# Patient Record
Sex: FEMALE | Race: WHITE | NOT HISPANIC OR LATINO | Employment: OTHER | URBAN - METROPOLITAN AREA
[De-identification: names, ages, dates, MRNs, and addresses within clinical notes are randomized per-mention and may not be internally consistent; named-entity substitution may affect disease eponyms.]

---

## 2017-05-15 ENCOUNTER — OFFICE VISIT (OUTPATIENT)
Dept: GASTROENTEROLOGY | Facility: CLINIC | Age: 67
End: 2017-05-15

## 2017-05-15 VITALS
DIASTOLIC BLOOD PRESSURE: 80 MMHG | HEIGHT: 62 IN | SYSTOLIC BLOOD PRESSURE: 130 MMHG | WEIGHT: 180.4 LBS | BODY MASS INDEX: 33.2 KG/M2

## 2017-05-15 DIAGNOSIS — K21.9 GASTROESOPHAGEAL REFLUX DISEASE WITHOUT ESOPHAGITIS: ICD-10-CM

## 2017-05-15 DIAGNOSIS — K52.9 INFLAMMATORY BOWEL DISEASES (IBD): ICD-10-CM

## 2017-05-15 DIAGNOSIS — K22.70 BARRETT'S ESOPHAGUS WITHOUT DYSPLASIA: ICD-10-CM

## 2017-05-15 DIAGNOSIS — R12 HEARTBURN: Primary | ICD-10-CM

## 2017-05-15 DIAGNOSIS — R10.9 CHRONIC ABDOMINAL PAIN: ICD-10-CM

## 2017-05-15 DIAGNOSIS — R14.0 ABDOMINAL BLOATING: ICD-10-CM

## 2017-05-15 DIAGNOSIS — K58.9 IRRITABLE BOWEL SYNDROME WITHOUT DIARRHEA: ICD-10-CM

## 2017-05-15 DIAGNOSIS — K50.10 CROHN'S DISEASE OF LARGE INTESTINE WITHOUT COMPLICATION (HCC): ICD-10-CM

## 2017-05-15 DIAGNOSIS — G89.29 CHRONIC ABDOMINAL PAIN: ICD-10-CM

## 2017-05-15 PROCEDURE — 99213 OFFICE O/P EST LOW 20 MIN: CPT | Performed by: INTERNAL MEDICINE

## 2017-06-02 RX ORDER — AMITRIPTYLINE HYDROCHLORIDE 25 MG/1
TABLET, FILM COATED ORAL
Qty: 30 TABLET | Refills: 5 | Status: SHIPPED | OUTPATIENT
Start: 2017-06-02 | End: 2017-11-10 | Stop reason: SDUPTHER

## 2017-06-02 RX ORDER — POTASSIUM CHLORIDE 750 MG/1
TABLET, FILM COATED, EXTENDED RELEASE ORAL
Qty: 120 TABLET | Refills: 5 | Status: SHIPPED | OUTPATIENT
Start: 2017-06-02 | End: 2018-01-04 | Stop reason: SDUPTHER

## 2017-06-06 ENCOUNTER — TELEPHONE (OUTPATIENT)
Dept: GASTROENTEROLOGY | Facility: CLINIC | Age: 67
End: 2017-06-06

## 2017-06-06 NOTE — TELEPHONE ENCOUNTER
Called [facility] and spoke with pharmacist. Pharmacist states pt was previously on a potassium chloride 10 mEq capsule that was much smaller than the potassium tablets we called in for her recently. Advised that as long as pt receiving potassium chloride 10 mEq four times daily, OK to change to capsule for comfort of pt.

## 2017-06-06 NOTE — TELEPHONE ENCOUNTER
----- Message from Cj Hobbs sent at 6/6/2017 10:31 AM EDT -----  Regarding: REFILL MEDS  Contact: [phone]   PT IS CALLING ABOUT MEDICATION potassium chloride? PT REQUESTING A REFILL BUT WANT WHAT SHE USE TO TAKE, SAYS THESES PILLS ARE TO BIG TO SWALLOW.   potassium chloride ER FRANCISCO 4 TIMES A DAY    PHARM#[phone]

## 2017-06-06 NOTE — TELEPHONE ENCOUNTER
----- Message from Radha Buchanan sent at 6/6/2017 11:34 AM EDT -----  Regarding: EARNESTINE  Contact: [phone]  BRITT FROM [facility] CALLED ABOUT

## 2017-06-14 ENCOUNTER — TRANSCRIBE ORDERS (OUTPATIENT)
Dept: ADMINISTRATIVE | Facility: HOSPITAL | Age: 67
End: 2017-06-14

## 2017-06-14 DIAGNOSIS — Z12.31 VISIT FOR SCREENING MAMMOGRAM: Primary | ICD-10-CM

## 2017-08-11 ENCOUNTER — TRANSCRIBE ORDERS (OUTPATIENT)
Dept: ADMINISTRATIVE | Facility: HOSPITAL | Age: 67
End: 2017-08-11

## 2017-08-11 DIAGNOSIS — M81.0 OSTEOPOROSIS: Primary | ICD-10-CM

## 2017-09-11 ENCOUNTER — HOSPITAL ENCOUNTER (OUTPATIENT)
Dept: BONE DENSITY | Facility: HOSPITAL | Age: 67
Discharge: HOME | End: 2017-09-11

## 2017-09-11 ENCOUNTER — HOSPITAL ENCOUNTER (OUTPATIENT)
Dept: MAMMOGRAPHY | Facility: HOSPITAL | Age: 67
Discharge: HOME | End: 2017-09-11
Admitting: FAMILY MEDICINE

## 2017-09-11 DIAGNOSIS — M81.0 OSTEOPOROSIS: ICD-10-CM

## 2017-09-11 DIAGNOSIS — Z12.31 VISIT FOR SCREENING MAMMOGRAM: ICD-10-CM

## 2017-09-11 PROCEDURE — 77080 DXA BONE DENSITY AXIAL: CPT

## 2017-09-11 PROCEDURE — G0202 SCR MAMMO BI INCL CAD: HCPCS

## 2017-09-11 PROCEDURE — 77063 BREAST TOMOSYNTHESIS BI: CPT

## 2017-09-18 ENCOUNTER — ANESTHESIA EVENT (OUTPATIENT)
Dept: GASTROENTEROLOGY | Facility: HOSPITAL | Age: 67
End: 2017-09-18

## 2017-09-18 ENCOUNTER — ANESTHESIA (OUTPATIENT)
Dept: GASTROENTEROLOGY | Facility: HOSPITAL | Age: 67
End: 2017-09-18

## 2017-09-18 ENCOUNTER — HOSPITAL ENCOUNTER (OUTPATIENT)
Facility: HOSPITAL | Age: 67
Setting detail: HOSPITAL OUTPATIENT SURGERY
Discharge: HOME | End: 2017-09-18
Attending: INTERNAL MEDICINE | Admitting: INTERNAL MEDICINE

## 2017-09-18 VITALS
TEMPERATURE: 97.7 F | OXYGEN SATURATION: 97 % | HEART RATE: 64 BPM | RESPIRATION RATE: 18 BRPM | SYSTOLIC BLOOD PRESSURE: 114 MMHG | HEIGHT: 62 IN | BODY MASS INDEX: 32.94 KG/M2 | WEIGHT: 179 LBS | DIASTOLIC BLOOD PRESSURE: 58 MMHG

## 2017-09-18 DIAGNOSIS — K50.10 CROHN'S DISEASE OF LARGE INTESTINE WITHOUT COMPLICATION (HCC): ICD-10-CM

## 2017-09-18 DIAGNOSIS — K21.9 GASTROESOPHAGEAL REFLUX DISEASE WITHOUT ESOPHAGITIS: ICD-10-CM

## 2017-09-18 DIAGNOSIS — R14.0 ABDOMINAL BLOATING: ICD-10-CM

## 2017-09-18 DIAGNOSIS — K58.9 IRRITABLE BOWEL SYNDROME WITHOUT DIARRHEA: ICD-10-CM

## 2017-09-18 DIAGNOSIS — K52.9 INFLAMMATORY BOWEL DISEASES (IBD): ICD-10-CM

## 2017-09-18 DIAGNOSIS — G89.29 CHRONIC ABDOMINAL PAIN: ICD-10-CM

## 2017-09-18 DIAGNOSIS — R12 HEARTBURN: ICD-10-CM

## 2017-09-18 DIAGNOSIS — K22.70 BARRETT'S ESOPHAGUS WITHOUT DYSPLASIA: ICD-10-CM

## 2017-09-18 DIAGNOSIS — R10.9 CHRONIC ABDOMINAL PAIN: ICD-10-CM

## 2017-09-18 PROCEDURE — 43239 EGD BIOPSY SINGLE/MULTIPLE: CPT | Performed by: INTERNAL MEDICINE

## 2017-09-18 PROCEDURE — 45380 COLONOSCOPY AND BIOPSY: CPT | Performed by: INTERNAL MEDICINE

## 2017-09-18 PROCEDURE — 25010000002 PROPOFOL 10 MG/ML EMULSION: Performed by: NURSE ANESTHETIST, CERTIFIED REGISTERED

## 2017-09-18 PROCEDURE — S0260 H&P FOR SURGERY: HCPCS | Performed by: INTERNAL MEDICINE

## 2017-09-18 PROCEDURE — 88305 TISSUE EXAM BY PATHOLOGIST: CPT | Performed by: INTERNAL MEDICINE

## 2017-09-18 RX ORDER — PROPOFOL 10 MG/ML
VIAL (ML) INTRAVENOUS CONTINUOUS PRN
Status: DISCONTINUED | OUTPATIENT
Start: 2017-09-18 | End: 2017-09-18 | Stop reason: SURG

## 2017-09-18 RX ORDER — PROPOFOL 10 MG/ML
VIAL (ML) INTRAVENOUS AS NEEDED
Status: DISCONTINUED | OUTPATIENT
Start: 2017-09-18 | End: 2017-09-18 | Stop reason: SURG

## 2017-09-18 RX ORDER — SODIUM CHLORIDE, SODIUM LACTATE, POTASSIUM CHLORIDE, CALCIUM CHLORIDE 600; 310; 30; 20 MG/100ML; MG/100ML; MG/100ML; MG/100ML
30 INJECTION, SOLUTION INTRAVENOUS CONTINUOUS PRN
Status: DISCONTINUED | OUTPATIENT
Start: 2017-09-18 | End: 2017-09-18 | Stop reason: HOSPADM

## 2017-09-18 RX ORDER — LIDOCAINE HYDROCHLORIDE 20 MG/ML
INJECTION, SOLUTION INFILTRATION; PERINEURAL AS NEEDED
Status: DISCONTINUED | OUTPATIENT
Start: 2017-09-18 | End: 2017-09-18 | Stop reason: SURG

## 2017-09-18 RX ADMIN — PROPOFOL 200 MCG/KG/MIN: 10 INJECTION, EMULSION INTRAVENOUS at 09:32

## 2017-09-18 RX ADMIN — SODIUM CHLORIDE, POTASSIUM CHLORIDE, SODIUM LACTATE AND CALCIUM CHLORIDE: 600; 310; 30; 20 INJECTION, SOLUTION INTRAVENOUS at 09:30

## 2017-09-18 RX ADMIN — SODIUM CHLORIDE, POTASSIUM CHLORIDE, SODIUM LACTATE AND CALCIUM CHLORIDE 30 ML/HR: 600; 310; 30; 20 INJECTION, SOLUTION INTRAVENOUS at 08:50

## 2017-09-18 RX ADMIN — PROPOFOL 100 MG: 10 INJECTION, EMULSION INTRAVENOUS at 09:32

## 2017-09-18 RX ADMIN — LIDOCAINE HYDROCHLORIDE 60 MG: 20 INJECTION, SOLUTION INFILTRATION; PERINEURAL at 09:32

## 2017-09-18 NOTE — ANESTHESIA PREPROCEDURE EVALUATION
Anesthesia Evaluation     no history of anesthetic complications:         Airway   Mallampati: II  TM distance: >3 FB  Neck ROM: full  small opening  Dental    (+) implants    Comment: Across top    Pulmonary    (-) shortness of breathSleep apnea: snores.  Cardiovascular     (+) hypertension, cardiac stents   (-) dysrhythmias, angina      Neuro/Psych  GI/Hepatic/Renal/Endo    (+)  GERD (Barretts, Crohns),   (-) liver disease (elevated LFTs)    Musculoskeletal     Abdominal    Substance History      OB/GYN          Other                                      Anesthesia Plan    ASA 2     MAC     intravenous induction   Anesthetic plan and risks discussed with patient.

## 2017-09-18 NOTE — H&P
Progress Notes  Encounter Date: 5/15/2017  Lamont Milian MD   Gastroenterology   Expand All Collapse All    []Hide copied text      Chief Complaint   Patient presents with   • Follow-up         Kirstin Hughes is a  66 y.o. female here for a follow up visit for abd pain, crohns disease, IBS     HPI Comments: Kirstin Hughes is a 65 y.o. female who presents in follow-up for Riddle's esophagus, Colonic Crohn's disease, irritable bowel syndrome, heartburn, nausea, colon polyps. Colonoscopy and EGD in August 2015 showed colon polyps and Riddle's esophagus without dysplasia. Her Crohn's disease is been in remission for greater than 5 years. We do colonoscopy every 2 years for surveillance.     Her irritable bowel syndrome with diarrhea is in remission.  She takes Levsin and Elavil as needed.  No nausea, vomiting or abdominal pain.     Regarding her Crohn's disease, she is still in remission.  No rectal bleeding.     No extraintestinal manifestations of inflammatory bowel disease            Medical History         Past Medical History:   Diagnosis Date   • Abnormal LFTs     • Riddle esophagus     • Crohn's disease     • Diverticulitis     • Diverticulosis     • GERD (gastroesophageal reflux disease)     • Hypertension     • Internal hemorrhoids     • Osteoarthritis     • Sleep apnea     • Thyroid disease               Surgical History          Past Surgical History:   Procedure Laterality Date   • BILATERAL BREAST REDUCTION       • CATARACT EXTRACTION       • COLON RESECTION       • COLONOSCOPY   8/20015     graeme DAMIAN   • HYSTERECTOMY       • REDUCTION MAMMAPLASTY       • TONSILLECTOMY       • UPPER GASTROINTESTINAL ENDOSCOPY   08/2015     z line irrefgular            Scheduled Meds:     Continuous Infusions:  No current facility-administered medications for this visit.      PRN Meds:.          Allergies   Allergen Reactions   • Flagyl [Metronidazole]     • Levaquin [Levofloxacin]            Social History     Social History            Social History   • Marital status:        Spouse name: N/A   • Number of children: N/A   • Years of education: N/A          Occupational History   • Not on file.            Social History Main Topics   • Smoking status: Former Smoker       Types: Cigarettes       Quit date: 1993   • Smokeless tobacco: Never Used   • Alcohol use No   • Drug use: No   • Sexual activity: Not on file           Other Topics Concern   • Not on file      Social History Narrative                  Family History   Problem Relation Age of Onset   • Colon polyps Father     • Pancreatic cancer Paternal Grandmother     • Colon cancer Maternal Grandfather     • Breast cancer Sister           Review of Systems   Gastrointestinal: Positive for abdominal distention, abdominal pain and diarrhea. Negative for anal bleeding, blood in stool, constipation, nausea, rectal pain and vomiting.   All other systems reviewed and are negative.        Vitals:     05/15/17 1250   BP: 130/80         Physical Exam   Constitutional: She is oriented to person, place, and time. She appears well-developed and well-nourished.   HENT:   Head: Normocephalic and atraumatic.   Eyes: EOM are normal. Pupils are equal, round, and reactive to light.   Cardiovascular: Normal rate, regular rhythm and normal heart sounds.    Pulmonary/Chest: Effort normal.   Abdominal: Soft. Bowel sounds are normal. She exhibits no distension and no mass. There is no tenderness. No hernia.   Musculoskeletal: Normal range of motion.   Neurological: She is alert and oriented to person, place, and time.   Skin: Skin is dry.   Psychiatric: She has a normal mood and affect. Her behavior is normal. Judgment and thought content normal.         No images are attached to the encounter.     Problem list     Crohn's disease:  Abdominal pain  Irritable bowel syndrome  Diarrhea  Riddle's esophagus        Assessment/Plan     EGD and colonoscopy will be scheduled for 2 year  surveillance     An EGD and  colonoscopy will be scheduled by my staff, the instructions will either be handed to you or mailed to you.  You'll receive an appointment date and time.  You will need to bring a  with you on that day to drive you home.     Continue Levsin and Elavil     Please begin a probiotic.  You can try activia yogurt, align, or florastor.  These can be found over-the-counter at a local grocery store.           Office Visit on 5/15/2017              Detailed Report

## 2017-09-18 NOTE — PLAN OF CARE
Problem: Patient Care Overview (Adult)  Goal: Plan of Care Review  Outcome: Ongoing (interventions implemented as appropriate)    09/18/17 0828   Coping/Psychosocial Response Interventions   Plan Of Care Reviewed With patient   Patient Care Overview   Progress no change       Goal: Adult Individualization and Mutuality  Outcome: Ongoing (interventions implemented as appropriate)  Goal: Discharge Needs Assessment  Outcome: Ongoing (interventions implemented as appropriate)    09/18/17 0828   Discharge Needs Assessment   Concerns To Be Addressed no discharge needs identified         Problem: GI Endoscopy (Adult)  Goal: Signs and Symptoms of Listed Potential Problems Will be Absent or Manageable (GI Endoscopy)  Outcome: Ongoing (interventions implemented as appropriate)    09/18/17 0828   GI Endoscopy   Problems Assessed (GI Endoscopy) all   Problems Present (GI Endoscopy) none

## 2017-09-18 NOTE — DISCHARGE INSTRUCTIONS
For the next 24 hours patient needs to be with a responsible adult.    For 24 hours DO NOT drive, operate machinery, appliances, drink alcohol, make important decisions or sign legal documents.    Start with a light or bland diet and advance to regular diet as tolerated.    Follow recommendations on procedure report provided by your doctor.    Call Dr Milian for problems [phone]. Call for pathology results in 2 weeks.    Problems may include but not limited to: large amounts of bleeding, trouble breathing, repeated vomiting, severe unrelieved pain, fever or chills.

## 2017-09-18 NOTE — ANESTHESIA POSTPROCEDURE EVALUATION
"Patient: Kirstin Hughes    Procedure Summary     Date Anesthesia Start Anesthesia Stop Room / Location    09/18/17 0929 0958  [facility] /  [facility]       Procedure Diagnosis Surgeon Provider    ESOPHAGOGASTRODUODENOSCOPY WITH BIOPSY (N/A Esophagus); COLONOSCOPY TO CECUM AND TERM ILEUM  WITH BIOPSIES (N/A ) Heartburn; Abdominal bloating; Chronic abdominal pain; Gastroesophageal reflux disease without esophagitis; Inflammatory bowel diseases (IBD); Riddle's esophagus without dysplasia; Crohn's disease of large intestine without complication; Irritable bowel syndrome without diarrhea  (Heartburn [R12]; Abdominal bloating [R14.0]; Chronic abdominal pain [R10.9, G89.29]; Gastroesophageal reflux disease without esophagitis [K21.9]; Inflammatory bowel diseases (IBD) [K63.89]; Riddle's esophagus without dysplasia [K22.70]; Crohn's disease of large intestine without complication [K50.10]; Irritable bowel syndrome without diarrhea [K58.9]) MD Jana Boucher MD          Anesthesia Type: MAC  Last vitals  BP   114/58 (09/18/17 1019)    Temp   36.5 °C (97.7 °F) (09/18/17 1009)    Pulse   64 (09/18/17 1019)   Resp   18 (09/18/17 1019)    SpO2   97 % (09/18/17 1019)      Post Anesthesia Care and Evaluation    Patient location during evaluation: PACU  Patient participation: complete - patient participated  Level of consciousness: awake  Pain score: 0  Pain management: adequate  Airway patency: patent  Anesthetic complications: No anesthetic complications    Cardiovascular status: acceptable  Respiratory status: acceptable  Hydration status: acceptable    Comments: Blood pressure 114/58, pulse 64, temperature 36.5 °C (97.7 °F), temperature source Oral, resp. rate 18, height 61.5\" (156.2 cm), weight 179 lb (81.2 kg), SpO2 97 %.        "

## 2017-09-19 LAB
CYTO UR: NORMAL
LAB AP CASE REPORT: NORMAL
Lab: NORMAL
PATH REPORT.FINAL DX SPEC: NORMAL
PATH REPORT.GROSS SPEC: NORMAL

## 2017-09-26 ENCOUNTER — TELEPHONE (OUTPATIENT)
Dept: GASTROENTEROLOGY | Facility: CLINIC | Age: 67
End: 2017-09-26

## 2017-09-26 NOTE — TELEPHONE ENCOUNTER
----- Message from Lamont Milian MD sent at 9/19/2017 12:47 PM EDT -----  Pathology benign.  EGD and colonoscopy 2 years recall  Office Visit 6 months

## 2017-09-26 NOTE — TELEPHONE ENCOUNTER
Patient called, advised of Dr. Milian's note. She verb understanding. Patient's health maintenance record was updated to reflect the need to repeat her colonoscopy and EGD in 2 years.

## 2017-11-10 RX ORDER — AMITRIPTYLINE HYDROCHLORIDE 25 MG/1
TABLET, FILM COATED ORAL
Qty: 30 TABLET | Refills: 8 | Status: SHIPPED | OUTPATIENT
Start: 2017-11-10 | End: 2018-06-25 | Stop reason: SDUPTHER

## 2018-01-06 RX ORDER — POTASSIUM CHLORIDE 750 MG/1
TABLET, FILM COATED, EXTENDED RELEASE ORAL
Qty: 120 TABLET | Refills: 4 | Status: SHIPPED | OUTPATIENT
Start: 2018-01-06 | End: 2018-05-29 | Stop reason: SDUPTHER

## 2018-05-30 RX ORDER — POTASSIUM CHLORIDE 750 MG/1
TABLET, FILM COATED, EXTENDED RELEASE ORAL
Qty: 120 TABLET | Refills: 3 | Status: SHIPPED | OUTPATIENT
Start: 2018-05-30 | End: 2018-09-28 | Stop reason: SDUPTHER

## 2018-06-25 ENCOUNTER — OFFICE VISIT (OUTPATIENT)
Dept: GASTROENTEROLOGY | Facility: CLINIC | Age: 68
End: 2018-06-25

## 2018-06-25 VITALS
WEIGHT: 181.2 LBS | BODY MASS INDEX: 33.34 KG/M2 | SYSTOLIC BLOOD PRESSURE: 150 MMHG | DIASTOLIC BLOOD PRESSURE: 92 MMHG | TEMPERATURE: 99.1 F | HEIGHT: 62 IN

## 2018-06-25 DIAGNOSIS — R12 HEARTBURN: ICD-10-CM

## 2018-06-25 DIAGNOSIS — K22.70 BARRETT'S ESOPHAGUS WITHOUT DYSPLASIA: Primary | ICD-10-CM

## 2018-06-25 DIAGNOSIS — R14.0 ABDOMINAL BLOATING: ICD-10-CM

## 2018-06-25 DIAGNOSIS — K52.9 INFLAMMATORY BOWEL DISEASES (IBD): ICD-10-CM

## 2018-06-25 DIAGNOSIS — K21.9 GASTROESOPHAGEAL REFLUX DISEASE WITHOUT ESOPHAGITIS: ICD-10-CM

## 2018-06-25 DIAGNOSIS — K50.10 CROHN'S DISEASE OF LARGE INTESTINE WITHOUT COMPLICATION (HCC): ICD-10-CM

## 2018-06-25 PROCEDURE — 99213 OFFICE O/P EST LOW 20 MIN: CPT | Performed by: INTERNAL MEDICINE

## 2018-06-25 RX ORDER — AMITRIPTYLINE HYDROCHLORIDE 25 MG/1
25 TABLET, FILM COATED ORAL
Qty: 30 TABLET | Refills: 8 | Status: SHIPPED | OUTPATIENT
Start: 2018-06-25 | End: 2019-03-28 | Stop reason: SDUPTHER

## 2018-06-25 NOTE — PROGRESS NOTES
No chief complaint on file.      Kirstin Hughes is a  68 y.o. female here for a follow up visit for abdominal pain, IBS-D, Riddle's esophagus no dysplasia, Crohn's disease in remission    HPI Comments: Kirstin Hughes is a 65 y.o. female who presents in follow-up for Riddle's esophagus, Colonic Crohn's disease, irritable bowel syndrome, heartburn, nausea, colon polyps. Colonoscopy and EGD in August 2017 showed  Riddle's esophagus without dysplasia. Her Crohn's disease is been in remission for greater than 5 years. We do colonoscopy every 2 years for surveillance.     Her irritable bowel syndrome with diarrhea is in remission.  She takes Levsin prn and Elavil 25mg po qhs.  No nausea, vomiting or abdominal pain.     Regarding her Crohn's disease, she is still in remission.  No rectal bleeding.     No extraintestinal manifestations of inflammatory bowel disease  GERD is in remission with over-the-counter Nexium          Past Medical History:   Diagnosis Date   • Abnormal LFTs    • Riddle esophagus    • Crohn's disease    • Diverticulitis    • Diverticulosis    • GERD (gastroesophageal reflux disease)    • Hypertension    • Internal hemorrhoids    • Osteoarthritis    • Sleep apnea    • Thyroid disease        Past Surgical History:   Procedure Laterality Date   • BILATERAL BREAST REDUCTION     • CATARACT EXTRACTION     • CERVICAL DISC SURGERY     • COLON RESECTION      DIVERTICULITIS 2007 - POKORNY   • COLONOSCOPY  8/20015    NBIH, tics   • COLONOSCOPY N/A 9/18/2017    Diverticulosis, IH, Crohn's in remission, Path benign   • ENDOSCOPY N/A 9/18/2017    Z-line irreg, Path benign   • HYSTERECTOMY     • REDUCTION MAMMAPLASTY     • SALIVARY GLAND EXCISION     • THYROIDECTOMY, PARTIAL     • TONSILLECTOMY     • UPPER GASTROINTESTINAL ENDOSCOPY  08/2015    z line irrefgular       Scheduled Meds:    Continuous Infusions:  No current facility-administered medications for this visit.     PRN Meds:.    Allergies   Allergen  Reactions   • Flagyl [Metronidazole]    • Levaquin [Levofloxacin]        Social History     Social History   • Marital status:      Spouse name: N/A   • Number of children: N/A   • Years of education: N/A     Occupational History   • Not on file.     Social History Main Topics   • Smoking status: Former Smoker     Types: Cigarettes     Quit date: 1993   • Smokeless tobacco: Never Used   • Alcohol use No   • Drug use: No   • Sexual activity: Not on file     Other Topics Concern   • Not on file     Social History Narrative   • No narrative on file       Family History   Problem Relation Age of Onset   • Colon polyps Father    • Pancreatic cancer Paternal Grandmother    • Colon cancer Maternal Grandfather    • Breast cancer Sister        Review of Systems   Gastrointestinal: Positive for abdominal distention, abdominal pain, diarrhea and nausea.   All other systems reviewed and are negative.      There were no vitals filed for this visit.    Physical Exam   Constitutional: She is oriented to person, place, and time. She appears well-developed and well-nourished.   HENT:   Head: Normocephalic and atraumatic.   Eyes: Pupils are equal, round, and reactive to light.   Cardiovascular: Normal rate, regular rhythm and normal heart sounds.    Pulmonary/Chest: Effort normal and breath sounds normal.   Abdominal: Soft. Bowel sounds are normal. She exhibits no shifting dullness, no distension, no pulsatile liver, no fluid wave, no abdominal bruit, no ascites, no pulsatile midline mass and no mass. There is no hepatosplenomegaly. There is no tenderness. There is no rigidity and no guarding. No hernia.   Musculoskeletal: Normal range of motion.   Neurological: She is alert and oriented to person, place, and time.   Skin: Skin is warm and dry.   Psychiatric: She has a normal mood and affect. Her behavior is normal. Thought content normal.   Nursing note and vitals reviewed.      No images are attached to the  encounter.    Problem list    GERD  Riddle's esophagus  IBS-D  Crohn's disease  Chronic abdominal pain      Assessment/Plan    Increase Elavil to 37.5 mg by mouth daily at bedtime  Levsin as needed  Nexium over-the-counter to be taken in the evening to prevent nighttime reflux  She may need an over-the-counter Nexium in the morning if she has daytime reflux  Colonoscopy and EGD for surveillance for Crohn's disease in 1 year  Return to clinic 6 months

## 2018-07-31 ENCOUNTER — TRANSCRIBE ORDERS (OUTPATIENT)
Dept: ADMINISTRATIVE | Facility: HOSPITAL | Age: 68
End: 2018-07-31

## 2018-07-31 DIAGNOSIS — Z12.31 VISIT FOR SCREENING MAMMOGRAM: Primary | ICD-10-CM

## 2018-09-12 ENCOUNTER — HOSPITAL ENCOUNTER (OUTPATIENT)
Dept: MAMMOGRAPHY | Facility: HOSPITAL | Age: 68
Discharge: HOME | End: 2018-09-12
Admitting: FAMILY MEDICINE

## 2018-09-12 DIAGNOSIS — Z12.31 VISIT FOR SCREENING MAMMOGRAM: ICD-10-CM

## 2018-09-12 PROCEDURE — 77067 SCR MAMMO BI INCL CAD: CPT

## 2018-09-12 PROCEDURE — 77063 BREAST TOMOSYNTHESIS BI: CPT

## 2018-10-04 RX ORDER — POTASSIUM CHLORIDE 750 MG/1
TABLET, FILM COATED, EXTENDED RELEASE ORAL
Qty: 120 TABLET | Refills: 2 | Status: SHIPPED | OUTPATIENT
Start: 2018-10-04 | End: 2018-12-28 | Stop reason: SDUPTHER

## 2018-12-18 ENCOUNTER — OFFICE VISIT (OUTPATIENT)
Dept: GASTROENTEROLOGY | Facility: CLINIC | Age: 68
End: 2018-12-18

## 2018-12-18 VITALS
WEIGHT: 181.6 LBS | HEIGHT: 61 IN | BODY MASS INDEX: 34.29 KG/M2 | DIASTOLIC BLOOD PRESSURE: 90 MMHG | TEMPERATURE: 99.1 F | SYSTOLIC BLOOD PRESSURE: 143 MMHG

## 2018-12-18 DIAGNOSIS — K22.70 BARRETT'S ESOPHAGUS WITHOUT DYSPLASIA: Primary | ICD-10-CM

## 2018-12-18 DIAGNOSIS — R12 HEARTBURN: ICD-10-CM

## 2018-12-18 DIAGNOSIS — R14.0 ABDOMINAL BLOATING: ICD-10-CM

## 2018-12-18 DIAGNOSIS — K50.10 CROHN'S DISEASE OF LARGE INTESTINE WITHOUT COMPLICATION (HCC): ICD-10-CM

## 2018-12-18 DIAGNOSIS — K58.9 IRRITABLE BOWEL SYNDROME WITHOUT DIARRHEA: ICD-10-CM

## 2018-12-18 PROCEDURE — 99213 OFFICE O/P EST LOW 20 MIN: CPT | Performed by: INTERNAL MEDICINE

## 2019-01-03 RX ORDER — POTASSIUM CHLORIDE 750 MG/1
TABLET, FILM COATED, EXTENDED RELEASE ORAL
Qty: 120 TABLET | Refills: 1 | Status: SHIPPED | OUTPATIENT
Start: 2019-01-03 | End: 2019-02-21 | Stop reason: SDUPTHER

## 2019-02-22 RX ORDER — POTASSIUM CHLORIDE 750 MG/1
TABLET, FILM COATED, EXTENDED RELEASE ORAL
Qty: 120 TABLET | Refills: 5 | Status: SHIPPED | OUTPATIENT
Start: 2019-02-22 | End: 2019-10-09 | Stop reason: SDUPTHER

## 2019-04-09 RX ORDER — AMITRIPTYLINE HYDROCHLORIDE 25 MG/1
50 TABLET, FILM COATED ORAL NIGHTLY PRN
Qty: 60 TABLET | Refills: 2 | Status: SHIPPED | OUTPATIENT
Start: 2019-04-09 | End: 2019-07-28 | Stop reason: SDUPTHER

## 2019-05-02 ENCOUNTER — TRANSCRIBE ORDERS (OUTPATIENT)
Dept: ADMINISTRATIVE | Facility: HOSPITAL | Age: 69
End: 2019-05-02

## 2019-05-02 DIAGNOSIS — I73.00 SECONDARY RAYNAUD'S PHENOMENON: Primary | ICD-10-CM

## 2019-05-29 ENCOUNTER — HOSPITAL ENCOUNTER (OUTPATIENT)
Dept: RESPIRATORY THERAPY | Facility: HOSPITAL | Age: 69
Discharge: HOME | End: 2019-05-29
Admitting: NURSE PRACTITIONER

## 2019-05-29 ENCOUNTER — HOSPITAL ENCOUNTER (OUTPATIENT)
Dept: GENERAL RADIOLOGY | Facility: HOSPITAL | Age: 69
Discharge: HOME | End: 2019-05-29

## 2019-05-29 ENCOUNTER — HOSPITAL ENCOUNTER (OUTPATIENT)
Dept: CARDIOLOGY | Facility: HOSPITAL | Age: 69
Discharge: HOME | End: 2019-05-29

## 2019-05-29 VITALS
BODY MASS INDEX: 34.36 KG/M2 | DIASTOLIC BLOOD PRESSURE: 63 MMHG | WEIGHT: 182 LBS | HEIGHT: 61 IN | SYSTOLIC BLOOD PRESSURE: 111 MMHG

## 2019-05-29 DIAGNOSIS — I73.00 RAYNAUD'S DISEASE WITHOUT GANGRENE: ICD-10-CM

## 2019-05-29 DIAGNOSIS — I73.00 SECONDARY RAYNAUD'S PHENOMENON: ICD-10-CM

## 2019-05-29 DIAGNOSIS — R76.8 FALSE POSITIVE SEROLOGICAL TEST FOR SYPHILIS: ICD-10-CM

## 2019-05-29 LAB
AORTIC DIMENSIONLESS INDEX: 0.8 (DI)
BDY SITE: NORMAL
BH CV ECHO MEAS - ACS: 1.5 CM
BH CV ECHO MEAS - AO MAX PG (FULL): 0.27 MMHG
BH CV ECHO MEAS - AO MAX PG: 5.2 MMHG
BH CV ECHO MEAS - AO MEAN PG (FULL): 1 MMHG
BH CV ECHO MEAS - AO MEAN PG: 3 MMHG
BH CV ECHO MEAS - AO ROOT AREA (BSA CORRECTED): 1.6
BH CV ECHO MEAS - AO ROOT AREA: 6.6 CM^2
BH CV ECHO MEAS - AO ROOT DIAM: 2.9 CM
BH CV ECHO MEAS - AO V2 MAX: 114 CM/SEC
BH CV ECHO MEAS - AO V2 MEAN: 73.9 CM/SEC
BH CV ECHO MEAS - AO V2 VTI: 28.5 CM
BH CV ECHO MEAS - AVA(I,A): 1.9 CM^2
BH CV ECHO MEAS - AVA(I,D): 1.9 CM^2
BH CV ECHO MEAS - AVA(V,A): 2.2 CM^2
BH CV ECHO MEAS - AVA(V,D): 2.2 CM^2
BH CV ECHO MEAS - BSA(HAYCOCK): 1.9 M^2
BH CV ECHO MEAS - BSA: 1.8 M^2
BH CV ECHO MEAS - BZI_BMI: 34.4 KILOGRAMS/M^2
BH CV ECHO MEAS - BZI_METRIC_HEIGHT: 154.9 CM
BH CV ECHO MEAS - BZI_METRIC_WEIGHT: 82.6 KG
BH CV ECHO MEAS - EDV(CUBED): 103.8 ML
BH CV ECHO MEAS - EDV(MOD-SP2): 72 ML
BH CV ECHO MEAS - EDV(MOD-SP4): 82 ML
BH CV ECHO MEAS - EDV(TEICH): 102.4 ML
BH CV ECHO MEAS - EF(CUBED): 71.3 %
BH CV ECHO MEAS - EF(MOD-BP): 67 %
BH CV ECHO MEAS - EF(MOD-SP2): 69.4 %
BH CV ECHO MEAS - EF(MOD-SP4): 64.6 %
BH CV ECHO MEAS - EF(TEICH): 63 %
BH CV ECHO MEAS - ESV(CUBED): 29.8 ML
BH CV ECHO MEAS - ESV(MOD-SP2): 22 ML
BH CV ECHO MEAS - ESV(MOD-SP4): 29 ML
BH CV ECHO MEAS - ESV(TEICH): 37.9 ML
BH CV ECHO MEAS - FS: 34 %
BH CV ECHO MEAS - IVS/LVPW: 1.1
BH CV ECHO MEAS - IVSD: 0.9 CM
BH CV ECHO MEAS - LAT PEAK E' VEL: 7 CM/SEC
BH CV ECHO MEAS - LV DIASTOLIC VOL/BSA (35-75): 45.2 ML/M^2
BH CV ECHO MEAS - LV MASS(C)D: 132.3 GRAMS
BH CV ECHO MEAS - LV MASS(C)DI: 72.9 GRAMS/M^2
BH CV ECHO MEAS - LV MAX PG: 4.9 MMHG
BH CV ECHO MEAS - LV MEAN PG: 2 MMHG
BH CV ECHO MEAS - LV SYSTOLIC VOL/BSA (12-30): 16 ML/M^2
BH CV ECHO MEAS - LV V1 MAX: 111 CM/SEC
BH CV ECHO MEAS - LV V1 MEAN: 73.5 CM/SEC
BH CV ECHO MEAS - LV V1 VTI: 23.5 CM
BH CV ECHO MEAS - LVIDD: 4.7 CM
BH CV ECHO MEAS - LVIDS: 3.1 CM
BH CV ECHO MEAS - LVLD AP2: 7.2 CM
BH CV ECHO MEAS - LVLD AP4: 7.5 CM
BH CV ECHO MEAS - LVLS AP2: 6 CM
BH CV ECHO MEAS - LVLS AP4: 6 CM
BH CV ECHO MEAS - LVOT AREA (M): 2.3 CM^2
BH CV ECHO MEAS - LVOT AREA: 2.3 CM^2
BH CV ECHO MEAS - LVOT DIAM: 1.7 CM
BH CV ECHO MEAS - LVPWD: 0.8 CM
BH CV ECHO MEAS - MED PEAK E' VEL: 8 CM/SEC
BH CV ECHO MEAS - MV A DUR: 0.17 SEC
BH CV ECHO MEAS - MV A MAX VEL: 75.2 CM/SEC
BH CV ECHO MEAS - MV DEC SLOPE: 368 CM/SEC^2
BH CV ECHO MEAS - MV DEC TIME: 320 SEC
BH CV ECHO MEAS - MV E MAX VEL: 52.2 CM/SEC
BH CV ECHO MEAS - MV E/A: 0.69
BH CV ECHO MEAS - MV MAX PG: 2.2 MMHG
BH CV ECHO MEAS - MV MEAN PG: 1 MMHG
BH CV ECHO MEAS - MV P1/2T MAX VEL: 70.6 CM/SEC
BH CV ECHO MEAS - MV P1/2T: 56.2 MSEC
BH CV ECHO MEAS - MV V2 MAX: 74.9 CM/SEC
BH CV ECHO MEAS - MV V2 MEAN: 35.7 CM/SEC
BH CV ECHO MEAS - MV V2 VTI: 22.7 CM
BH CV ECHO MEAS - MVA P1/2T LCG: 3.1 CM^2
BH CV ECHO MEAS - MVA(P1/2T): 3.9 CM^2
BH CV ECHO MEAS - MVA(VTI): 2.3 CM^2
BH CV ECHO MEAS - PA ACC TIME: 0.12 SEC
BH CV ECHO MEAS - PA MAX PG (FULL): 1.4 MMHG
BH CV ECHO MEAS - PA MAX PG: 2.6 MMHG
BH CV ECHO MEAS - PA PR(ACCEL): 23.7 MMHG
BH CV ECHO MEAS - PA V2 MAX: 80.9 CM/SEC
BH CV ECHO MEAS - PULM A REVS DUR: 0.15 SEC
BH CV ECHO MEAS - PULM A REVS VEL: 44.1 CM/SEC
BH CV ECHO MEAS - PULM DIAS VEL: 42.2 CM/SEC
BH CV ECHO MEAS - PULM S/D: 1.4
BH CV ECHO MEAS - PULM SYS VEL: 60.1 CM/SEC
BH CV ECHO MEAS - PVA(V,A): 2.4 CM^2
BH CV ECHO MEAS - PVA(V,D): 2.4 CM^2
BH CV ECHO MEAS - QP/QS: 0.72
BH CV ECHO MEAS - RV MAX PG: 1.2 MMHG
BH CV ECHO MEAS - RV MEAN PG: 1 MMHG
BH CV ECHO MEAS - RV V1 MAX: 55.4 CM/SEC
BH CV ECHO MEAS - RV V1 MEAN: 34.2 CM/SEC
BH CV ECHO MEAS - RV V1 VTI: 11.1 CM
BH CV ECHO MEAS - RVOT AREA: 3.5 CM^2
BH CV ECHO MEAS - RVOT DIAM: 2.1 CM
BH CV ECHO MEAS - SI(AO): 103.7 ML/M^2
BH CV ECHO MEAS - SI(CUBED): 40.8 ML/M^2
BH CV ECHO MEAS - SI(LVOT): 29.4 ML/M^2
BH CV ECHO MEAS - SI(MOD-SP2): 27.5 ML/M^2
BH CV ECHO MEAS - SI(MOD-SP4): 29.2 ML/M^2
BH CV ECHO MEAS - SI(TEICH): 35.5 ML/M^2
BH CV ECHO MEAS - SV(AO): 188.2 ML
BH CV ECHO MEAS - SV(CUBED): 74 ML
BH CV ECHO MEAS - SV(LVOT): 53.3 ML
BH CV ECHO MEAS - SV(MOD-SP2): 50 ML
BH CV ECHO MEAS - SV(MOD-SP4): 53 ML
BH CV ECHO MEAS - SV(RVOT): 38.4 ML
BH CV ECHO MEAS - SV(TEICH): 64.4 ML
BH CV ECHO MEAS - TAPSE (>1.6): 2.2 CM2
BH CV ECHO MEASUREMENTS AVERAGE E/E' RATIO: 6.96
BH CV VAS BP RIGHT ARM: NORMAL MMHG
BH CV XLRA - RV BASE: 3.2 CM
BH CV XLRA - TDI S': 14 CM/SEC
HGB BLDA-MCNC: 14.6 G/DL
LEFT ATRIUM VOLUME INDEX: 16 ML/M2
LEFT ATRIUM VOLUME: 29 CM3
MAXIMAL PREDICTED HEART RATE: 150 BPM
STRESS TARGET HR: 128 BPM

## 2019-05-29 PROCEDURE — 82820 HEMOGLOBIN-OXYGEN AFFINITY: CPT | Performed by: NURSE PRACTITIONER

## 2019-05-29 PROCEDURE — 93306 TTE W/DOPPLER COMPLETE: CPT | Performed by: INTERNAL MEDICINE

## 2019-05-29 PROCEDURE — 93306 TTE W/DOPPLER COMPLETE: CPT

## 2019-05-29 PROCEDURE — 71046 X-RAY EXAM CHEST 2 VIEWS: CPT

## 2019-05-29 PROCEDURE — 94010 BREATHING CAPACITY TEST: CPT

## 2019-05-29 PROCEDURE — 94726 PLETHYSMOGRAPHY LUNG VOLUMES: CPT

## 2019-05-29 PROCEDURE — 94729 DIFFUSING CAPACITY: CPT

## 2019-07-30 RX ORDER — AMITRIPTYLINE HYDROCHLORIDE 25 MG/1
50 TABLET, FILM COATED ORAL NIGHTLY PRN
Qty: 60 TABLET | Refills: 0 | Status: SHIPPED | OUTPATIENT
Start: 2019-07-30 | End: 2019-08-29 | Stop reason: SDUPTHER

## 2019-08-30 RX ORDER — AMITRIPTYLINE HYDROCHLORIDE 25 MG/1
50 TABLET, FILM COATED ORAL NIGHTLY
Qty: 60 TABLET | Refills: 1 | Status: SHIPPED | OUTPATIENT
Start: 2019-08-30 | End: 2019-11-30 | Stop reason: SDUPTHER

## 2019-09-04 ENCOUNTER — TRANSCRIBE ORDERS (OUTPATIENT)
Dept: ADMINISTRATIVE | Facility: HOSPITAL | Age: 69
End: 2019-09-04

## 2019-09-04 DIAGNOSIS — Z12.31 VISIT FOR SCREENING MAMMOGRAM: Primary | ICD-10-CM

## 2019-09-17 ENCOUNTER — HOSPITAL ENCOUNTER (OUTPATIENT)
Dept: MAMMOGRAPHY | Facility: HOSPITAL | Age: 69
Discharge: HOME | End: 2019-09-17
Admitting: FAMILY MEDICINE

## 2019-09-17 DIAGNOSIS — Z12.31 VISIT FOR SCREENING MAMMOGRAM: ICD-10-CM

## 2019-09-17 PROCEDURE — 77067 SCR MAMMO BI INCL CAD: CPT

## 2019-09-17 PROCEDURE — 77063 BREAST TOMOSYNTHESIS BI: CPT

## 2019-10-09 RX ORDER — POTASSIUM CHLORIDE 750 MG/1
TABLET, FILM COATED, EXTENDED RELEASE ORAL
Qty: 120 TABLET | Refills: 4 | Status: SHIPPED | OUTPATIENT
Start: 2019-10-09 | End: 2020-03-21 | Stop reason: SDUPTHER

## 2019-11-19 ENCOUNTER — OFFICE VISIT (OUTPATIENT)
Dept: GASTROENTEROLOGY | Facility: CLINIC | Age: 69
End: 2019-11-19

## 2019-11-19 VITALS
DIASTOLIC BLOOD PRESSURE: 88 MMHG | WEIGHT: 184 LBS | BODY MASS INDEX: 34.74 KG/M2 | TEMPERATURE: 98.8 F | SYSTOLIC BLOOD PRESSURE: 158 MMHG | HEIGHT: 61 IN

## 2019-11-19 DIAGNOSIS — R14.0 ABDOMINAL BLOATING: ICD-10-CM

## 2019-11-19 DIAGNOSIS — K22.70 BARRETT'S ESOPHAGUS WITHOUT DYSPLASIA: Primary | ICD-10-CM

## 2019-11-19 DIAGNOSIS — K58.9 IRRITABLE BOWEL SYNDROME WITHOUT DIARRHEA: ICD-10-CM

## 2019-11-19 DIAGNOSIS — K21.9 GASTROESOPHAGEAL REFLUX DISEASE WITHOUT ESOPHAGITIS: ICD-10-CM

## 2019-11-19 PROCEDURE — 99213 OFFICE O/P EST LOW 20 MIN: CPT | Performed by: INTERNAL MEDICINE

## 2019-11-19 RX ORDER — TELMISARTAN 40 MG/1
40 TABLET ORAL DAILY
COMMUNITY
End: 2020-07-28 | Stop reason: ALTCHOICE

## 2019-11-19 NOTE — PROGRESS NOTES
Chief Complaint   Patient presents with   • Follow-up   • Heartburn   • Crohn's Disease       Kirstin Hughes is a  69 y.o. female here for a follow up visit for abdominal pain, IBS-D, Riddle's esophagus no dysplasia, Crohn's disease in remission     HPI Comments: Kirstin Hughes is a 68 y.o. female who presents in follow-up for Riddle's esophagus, Colonic Crohn's disease, irritable bowel syndrome, heartburn, nausea, colon polyps. Colonoscopy and EGD in August 2017 showed  Riddle's esophagus without dysplasia. Her Crohn's disease is been in remission for greater than 5 years. We do colonoscopy every 2 years for surveillance.     Her irritable bowel syndrome with diarrhea is in remission.  She takes Levsin prn and Elavil 25mg po qhs.  No nausea, vomiting or abdominal pain.     Regarding her Crohn's disease, she is still in remission.  No rectal bleeding.     No extraintestinal manifestations of inflammatory bowel disease  GERD is in remission with over-the-counter Nexium    HPI    Past Medical History:   Diagnosis Date   • Abnormal LFTs    • Riddle esophagus    • Crohn's disease (CMS/HCC)    • Diverticulitis    • Diverticulosis    • Fibromyalgia    • GERD (gastroesophageal reflux disease)    • Hypertension    • Internal hemorrhoids    • Osteoarthritis    • Sleep apnea    • Thyroid disease        Past Surgical History:   Procedure Laterality Date   • BILATERAL BREAST REDUCTION     • CATARACT EXTRACTION     • CERVICAL DISC SURGERY     • COLON RESECTION      DIVERTICULITIS 2007 - P[facility]ORNY   • COLONOSCOPY  8/20015    NBIH, tics   • COLONOSCOPY N/A 9/18/2017    Diverticulosis, IH, Crohn's in remission, Path benign   • ENDOSCOPY N/A 9/18/2017    Z-line irreg, Path benign   • HYSTERECTOMY     • REDUCTION MAMMAPLASTY     • SALIVARY GLAND EXCISION     • THYROIDECTOMY, PARTIAL     • TONSILLECTOMY     • UPPER GASTROINTESTINAL ENDOSCOPY  08/2015    z line irrefgular       Scheduled Meds:    Continuous Infusions:  No current  facility-administered medications for this visit.     PRN Meds:.    Allergies   Allergen Reactions   • Flagyl [Metronidazole] Nausea Only   • Levaquin [Levofloxacin] Nausea Only       Social History     Socioeconomic History   • Marital status:      Spouse name: Not on file   • Number of children: Not on file   • Years of education: Not on file   • Highest education level: Not on file   Tobacco Use   • Smoking status: Former Smoker     Types: Cigarettes     Last attempt to quit:      Years since quittin.8   • Smokeless tobacco: Never Used   Substance and Sexual Activity   • Alcohol use: No   • Drug use: No       Family History   Problem Relation Age of Onset   • Colon polyps Father    • Pancreatic cancer Paternal Grandmother    • Colon cancer Maternal Grandfather    • Breast cancer Sister        Review of Systems   Gastrointestinal: Positive for abdominal distention, abdominal pain and diarrhea.       Vitals:    19 1132   BP: 158/88   Temp: 98.8 °F (37.1 °C)       Physical Exam   Constitutional: She is oriented to person, place, and time. She appears well-developed and well-nourished.   HENT:   Head: Normocephalic and atraumatic.   Eyes: Pupils are equal, round, and reactive to light.   Cardiovascular: Normal rate, regular rhythm and normal heart sounds.   Pulmonary/Chest: Effort normal and breath sounds normal.   Abdominal: Soft. Bowel sounds are normal. She exhibits no shifting dullness, no distension, no pulsatile liver, no fluid wave, no abdominal bruit, no ascites, no pulsatile midline mass and no mass. There is no hepatosplenomegaly. There is no tenderness. There is no rigidity and no guarding. No hernia.   Musculoskeletal: Normal range of motion.   Neurological: She is alert and oriented to person, place, and time.   Skin: Skin is warm and dry.   Psychiatric: She has a normal mood and affect. Her behavior is normal. Thought content normal.   Nursing note and vitals reviewed.      No  images are attached to the encounter.    Problem list     GERD  Riddle's esophagus  IBS-D  Crohn's disease  Chronic abdominal pain        Assessment/Plan     Elavil 25 mg by mouth daily at bedtime prn  Levsin as needed  Nexium over-the-counter to be taken in the evening to prevent nighttime reflux  She may need an over-the-counter Nexium in the morning if she has daytime reflux  Colonoscopy and EGD for surveillance for Crohn's disease now   Return to clinic 6 months  Rheumatology referral for rash, Crohn's disease, Raynaud's phenomenon, arthritis, suspected fibromyalgia, continue to follow

## 2019-12-05 RX ORDER — AMITRIPTYLINE HYDROCHLORIDE 25 MG/1
TABLET, FILM COATED ORAL
Qty: 60 TABLET | Refills: 5 | Status: SHIPPED | OUTPATIENT
Start: 2019-12-05 | End: 2020-10-06

## 2020-01-06 ENCOUNTER — ANESTHESIA (OUTPATIENT)
Dept: GASTROENTEROLOGY | Facility: HOSPITAL | Age: 70
End: 2020-01-06

## 2020-01-06 ENCOUNTER — HOSPITAL ENCOUNTER (OUTPATIENT)
Facility: HOSPITAL | Age: 70
Setting detail: HOSPITAL OUTPATIENT SURGERY
Discharge: HOME | End: 2020-01-06
Attending: INTERNAL MEDICINE | Admitting: INTERNAL MEDICINE

## 2020-01-06 ENCOUNTER — ANESTHESIA EVENT (OUTPATIENT)
Dept: GASTROENTEROLOGY | Facility: HOSPITAL | Age: 70
End: 2020-01-06

## 2020-01-06 VITALS
HEIGHT: 61 IN | SYSTOLIC BLOOD PRESSURE: 110 MMHG | RESPIRATION RATE: 16 BRPM | WEIGHT: 180 LBS | BODY MASS INDEX: 33.99 KG/M2 | HEART RATE: 65 BPM | OXYGEN SATURATION: 98 % | TEMPERATURE: 98.2 F | DIASTOLIC BLOOD PRESSURE: 83 MMHG

## 2020-01-06 DIAGNOSIS — R14.0 ABDOMINAL BLOATING: ICD-10-CM

## 2020-01-06 DIAGNOSIS — K22.70 BARRETT'S ESOPHAGUS WITHOUT DYSPLASIA: ICD-10-CM

## 2020-01-06 DIAGNOSIS — K58.9 IRRITABLE BOWEL SYNDROME WITHOUT DIARRHEA: ICD-10-CM

## 2020-01-06 DIAGNOSIS — K21.9 GASTROESOPHAGEAL REFLUX DISEASE WITHOUT ESOPHAGITIS: ICD-10-CM

## 2020-01-06 PROCEDURE — 45380 COLONOSCOPY AND BIOPSY: CPT | Performed by: INTERNAL MEDICINE

## 2020-01-06 PROCEDURE — 88305 TISSUE EXAM BY PATHOLOGIST: CPT | Performed by: INTERNAL MEDICINE

## 2020-01-06 PROCEDURE — 43239 EGD BIOPSY SINGLE/MULTIPLE: CPT | Performed by: INTERNAL MEDICINE

## 2020-01-06 PROCEDURE — 25010000002 PROPOFOL 10 MG/ML EMULSION: Performed by: NURSE ANESTHETIST, CERTIFIED REGISTERED

## 2020-01-06 PROCEDURE — S0260 H&P FOR SURGERY: HCPCS | Performed by: INTERNAL MEDICINE

## 2020-01-06 PROCEDURE — 45385 COLONOSCOPY W/LESION REMOVAL: CPT | Performed by: INTERNAL MEDICINE

## 2020-01-06 RX ORDER — PROMETHAZINE HYDROCHLORIDE 25 MG/1
25 SUPPOSITORY RECTAL ONCE AS NEEDED
Status: DISCONTINUED | OUTPATIENT
Start: 2020-01-06 | End: 2020-01-06 | Stop reason: HOSPADM

## 2020-01-06 RX ORDER — PROMETHAZINE HYDROCHLORIDE 25 MG/1
25 TABLET ORAL ONCE AS NEEDED
Status: DISCONTINUED | OUTPATIENT
Start: 2020-01-06 | End: 2020-01-06 | Stop reason: HOSPADM

## 2020-01-06 RX ORDER — PROPOFOL 10 MG/ML
VIAL (ML) INTRAVENOUS CONTINUOUS PRN
Status: DISCONTINUED | OUTPATIENT
Start: 2020-01-06 | End: 2020-01-06 | Stop reason: SURG

## 2020-01-06 RX ORDER — SODIUM CHLORIDE, SODIUM LACTATE, POTASSIUM CHLORIDE, CALCIUM CHLORIDE 600; 310; 30; 20 MG/100ML; MG/100ML; MG/100ML; MG/100ML
1000 INJECTION, SOLUTION INTRAVENOUS CONTINUOUS
Status: DISCONTINUED | OUTPATIENT
Start: 2020-01-06 | End: 2020-01-06 | Stop reason: HOSPADM

## 2020-01-06 RX ORDER — PROPOFOL 10 MG/ML
VIAL (ML) INTRAVENOUS AS NEEDED
Status: DISCONTINUED | OUTPATIENT
Start: 2020-01-06 | End: 2020-01-06 | Stop reason: SURG

## 2020-01-06 RX ORDER — PROMETHAZINE HYDROCHLORIDE 25 MG/ML
12.5 INJECTION, SOLUTION INTRAMUSCULAR; INTRAVENOUS ONCE AS NEEDED
Status: DISCONTINUED | OUTPATIENT
Start: 2020-01-06 | End: 2020-01-06 | Stop reason: HOSPADM

## 2020-01-06 RX ORDER — GLYCOPYRROLATE 0.2 MG/ML
INJECTION INTRAMUSCULAR; INTRAVENOUS AS NEEDED
Status: DISCONTINUED | OUTPATIENT
Start: 2020-01-06 | End: 2020-01-06 | Stop reason: SURG

## 2020-01-06 RX ORDER — LIDOCAINE HYDROCHLORIDE 20 MG/ML
INJECTION, SOLUTION INFILTRATION; PERINEURAL AS NEEDED
Status: DISCONTINUED | OUTPATIENT
Start: 2020-01-06 | End: 2020-01-06 | Stop reason: SURG

## 2020-01-06 RX ADMIN — GLYCOPYRROLATE 0.2 MCG: 0.2 INJECTION INTRAMUSCULAR; INTRAVENOUS at 10:14

## 2020-01-06 RX ADMIN — SODIUM CHLORIDE, POTASSIUM CHLORIDE, SODIUM LACTATE AND CALCIUM CHLORIDE 1000 ML: 600; 310; 30; 20 INJECTION, SOLUTION INTRAVENOUS at 09:03

## 2020-01-06 RX ADMIN — PROPOFOL 180 MCG/KG/MIN: 10 INJECTION, EMULSION INTRAVENOUS at 10:18

## 2020-01-06 RX ADMIN — PROPOFOL 80 MG: 10 INJECTION, EMULSION INTRAVENOUS at 10:18

## 2020-01-06 RX ADMIN — LIDOCAINE HYDROCHLORIDE 60 MG: 20 INJECTION, SOLUTION INFILTRATION; PERINEURAL at 10:18

## 2020-01-06 NOTE — ANESTHESIA POSTPROCEDURE EVALUATION
Patient: Kirstin Hughes    Procedure Summary     Date:  01/06/20 Room / Location:  [facility] 8 /  [facility]    Anesthesia Start:  1014 Anesthesia Stop:  1048    Procedures:       ESOPHAGOGASTRODUODENOSCOPY WITH BX (N/A Esophagus)      COLONOSCOPY  TO ANASTOMOSIS INTO CECUM AND NORMAL TI WITH BX (N/A ) Diagnosis:       Riddle's esophagus without dysplasia      Gastroesophageal reflux disease without esophagitis      Abdominal bloating      Irritable bowel syndrome without diarrhea      (Riddle's esophagus without dysplasia [K22.70])      (Gastroesophageal reflux disease without esophagitis [K21.9])      (Abdominal bloating [R14.0])      (Irritable bowel syndrome without diarrhea [K58.9])    Surgeon:  Lamont Milian MD Provider:  Lexii Smith MD    Anesthesia Type:  MAC ASA Status:  3          Anesthesia Type: MAC    Vitals  Vitals Value Taken Time   /83 1/6/2020 11:08 AM   Temp     Pulse 65 1/6/2020 11:08 AM   Resp 16 1/6/2020 11:08 AM   SpO2 98 % 1/6/2020 11:08 AM           Post Anesthesia Care and Evaluation    Patient location during evaluation: bedside  Patient participation: complete - patient participated  Level of consciousness: awake  Pain management: adequate  Airway patency: patent  Anesthetic complications: No anesthetic complications    Cardiovascular status: acceptable  Respiratory status: acceptable  Hydration status: acceptable

## 2020-01-06 NOTE — ANESTHESIA PREPROCEDURE EVALUATION
Anesthesia Evaluation                  Airway   Mallampati: III  TM distance: >3 FB  Neck ROM: full  No difficulty expected  Dental      Comment: Upper caps    Pulmonary - normal exam   Cardiovascular - normal exam    (+) hypertension well controlled less than 2 medications,       Neuro/Psych  GI/Hepatic/Renal/Endo    (+)  GERD,      Musculoskeletal     Abdominal   (+) obese,    Substance History      OB/GYN          Other   arthritis,                      Anesthesia Plan    ASA 3     MAC     intravenous induction     Anesthetic plan, all risks, benefits, and alternatives have been provided, discussed and informed consent has been obtained with: patient.

## 2020-01-06 NOTE — H&P
[facility]  Pre Procedure History & Physical    Chief Complaint:    female here for a follow up visit for abdominal pain, IBS-D, Riddle's esophagus no dysplasia, Crohn's disease in remission    Subjective     HPI:    female here for a follow up visit for abdominal pain, IBS-D, Riddle's esophagus no dysplasia, Crohn's disease in remission    Past Medical History:   Past Medical History:   Diagnosis Date   • Abnormal LFTs    • Riddle esophagus    • CREST (calcinosis, Raynaud's phenomenon, esophageal dysfunction, sclerodactyly, telangiectasia) (CMS/HCC)    • Crohn's disease (CMS/HCC)    • Diverticulitis    • Diverticulosis    • Fibromyalgia    • GERD (gastroesophageal reflux disease)    • Hypertension    • Internal hemorrhoids    • Osteoarthritis    • Sleep apnea    • Thyroid disease        Past Surgical History:  Past Surgical History:   Procedure Laterality Date   • BILATERAL BREAST REDUCTION     • CATARACT EXTRACTION     • CERVICAL DISC SURGERY     • COLON RESECTION      DIVERTICULITIS 2007 - [place]   • COLONOSCOPY  8/20015    NBIH, tics   • COLONOSCOPY N/A 9/18/2017    Diverticulosis, IH, Crohn's in remission, Path benign   • ENDOSCOPY N/A 9/18/2017    Z-line irreg, Path benign   • HYSTERECTOMY     • REDUCTION MAMMAPLASTY     • SALIVARY GLAND EXCISION     • THYROIDECTOMY, PARTIAL     • TONSILLECTOMY     • UPPER GASTROINTESTINAL ENDOSCOPY  08/2015    z line irrefgular       Family History:  Family History   Problem Relation Age of Onset   • Colon polyps Father    • Pancreatic cancer Paternal Grandmother    • Colon cancer Maternal Grandfather    • Breast cancer Sister        Social History:   reports that she quit smoking about 27 years ago. Her smoking use included cigarettes. She has never used smokeless tobacco. She reports that she does not drink alcohol or use drugs.    Medications:   Medications Prior to Admission   Medication Sig Dispense Refill Last Dose   • estradiol (ESTRACE)  "0.5 MG tablet    1/5/2020 at Unknown time   • furosemide (LASIX) 40 MG tablet    1/5/2020 at Unknown time   • hyoscyamine (LEVSIN) 0.125 MG SL tablet DISSOLVE ONE TO TWO TABLETS UNDER THE TONGUE EVERY 6 HOURS AS NEEDED FOR ABDOMINAL PAIN OR CRAMPING 60 tablet 5 Past Week at Unknown time   • levothyroxine (SYNTHROID, LEVOTHROID) 75 MCG tablet    1/5/2020 at Unknown time   • Loratadine-Pseudoephedrine (PX ALLERGY RELIEF D, LORATID, PO) Take  by mouth.   1/5/2020 at Unknown time   • potassium chloride (K-DUR) 10 MEQ CR tablet TAKE ONE TABLET BY MOUTH FOUR TIMES A  tablet 4 1/5/2020 at Unknown time   • Probiotic Product (ALIGN PO) Take  by mouth.   Past Month at Unknown time   • telmisartan (MICARDIS) 40 MG tablet Take 40 mg by mouth Daily.   1/6/2020 at Unknown time   • amitriptyline (ELAVIL) 25 MG tablet TAKE TWO TABLETS BY MOUTH EVERY NIGHT 60 tablet 5 1/4/2020   • B Complex Vitamins (VITAMIN B-COMPLEX PO) Take  by mouth.   Taking   • esomeprazole (NexIUM) 20 MG capsule Take 20 mg by mouth every morning before breakfast.   1/5/2020   • HYDROcodone-acetaminophen (NORCO) 5-325 MG per tablet    1/4/2020   • irbesartan (AVAPRO) 150 MG tablet    Taking       Allergies:  Flagyl [metronidazole] and Levaquin [levofloxacin]    ROS:    10 point review of systems is otherwise negative, pertinent positives are found in the history of present illness or subjective portion of this dictation     Objective     Blood pressure 175/90, pulse 74, temperature 98.2 °F (36.8 °C), temperature source Oral, resp. rate 16, height 154.9 cm (61\"), weight 81.6 kg (180 lb), SpO2 98 %.    Physical Exam   Constitutional: Pt is oriented to person, place, and time and well-developed, well-nourished, and in no distress.   Mouth/Throat: Oropharynx is clear and moist.   Neck: Normal range of motion.   Cardiovascular: Normal rate, regular rhythm and normal heart sounds.    Pulmonary/Chest: Effort normal and breath sounds normal.   Abdominal: Soft. " Nontender  Skin: Skin is warm and dry.   Psychiatric: Mood, memory, affect and judgment normal.     Assessment/Plan     Diagnosis:  Crohn's disease Riddle's esophagus    Anticipated Surgical Procedure:  EGD and colonoscopy will be performed    The risks, benefits, and alternatives of this procedure have been discussed with the patient or the responsible party- the patient understands and agrees to proceed.

## 2020-01-07 LAB
CYTO UR: NORMAL
LAB AP CASE REPORT: NORMAL
LAB AP DIAGNOSIS COMMENT: NORMAL
PATH REPORT.FINAL DX SPEC: NORMAL
PATH REPORT.GROSS SPEC: NORMAL

## 2020-01-07 NOTE — PROGRESS NOTES
No H. pylori  Riddle's esophagus without dysplasia (short segment)  Tubular adenoma colon polyp  No evidence of dysplasia on  random colon biopsies  Crohn's disease in remission  EGD and colonoscopy recall 2 years  Office visit 6 months

## 2020-02-03 ENCOUNTER — TELEPHONE (OUTPATIENT)
Dept: GASTROENTEROLOGY | Facility: CLINIC | Age: 70
End: 2020-02-03

## 2020-02-03 NOTE — TELEPHONE ENCOUNTER
Repeat c/s and EGD in 2 yrs added to  06/06/2022.  Patient called, no answer, left message( ID'd patient) advising of Dr. Milian's note. Advised to call back for questions or concerns.

## 2020-02-03 NOTE — TELEPHONE ENCOUNTER
----- Message from Lamont Milian MD sent at 1/7/2020 12:03 PM EST -----  No H. pylori  Riddle's esophagus without dysplasia (short segment)  Tubular adenoma colon polyp  No evidence of dysplasia on  random colon biopsies  Crohn's disease in remission  EGD and colonoscopy recall 2 years  Office visit 6 months

## 2020-03-21 RX ORDER — POTASSIUM CHLORIDE 750 MG/1
TABLET, FILM COATED, EXTENDED RELEASE ORAL
Qty: 120 TABLET | Refills: 3 | Status: SHIPPED | OUTPATIENT
Start: 2020-03-21 | End: 2020-08-28

## 2020-07-28 ENCOUNTER — OFFICE VISIT (OUTPATIENT)
Dept: GASTROENTEROLOGY | Facility: CLINIC | Age: 70
End: 2020-07-28

## 2020-07-28 VITALS — BODY MASS INDEX: 34.48 KG/M2 | HEIGHT: 61 IN | TEMPERATURE: 98.4 F | WEIGHT: 182.6 LBS

## 2020-07-28 DIAGNOSIS — K21.9 GASTROESOPHAGEAL REFLUX DISEASE WITHOUT ESOPHAGITIS: ICD-10-CM

## 2020-07-28 DIAGNOSIS — K50.10 CROHN'S DISEASE OF LARGE INTESTINE WITHOUT COMPLICATION (HCC): ICD-10-CM

## 2020-07-28 DIAGNOSIS — K58.9 IRRITABLE BOWEL SYNDROME WITHOUT DIARRHEA: ICD-10-CM

## 2020-07-28 DIAGNOSIS — R14.0 ABDOMINAL BLOATING: ICD-10-CM

## 2020-07-28 DIAGNOSIS — R12 HEARTBURN: ICD-10-CM

## 2020-07-28 DIAGNOSIS — K22.70 BARRETT'S ESOPHAGUS WITHOUT DYSPLASIA: Primary | ICD-10-CM

## 2020-07-28 PROCEDURE — 99213 OFFICE O/P EST LOW 20 MIN: CPT | Performed by: INTERNAL MEDICINE

## 2020-07-28 NOTE — PROGRESS NOTES
Follow-up: Riddle's esophagus, Crohn's disease, IBS-D, GERD    Kirstin Hughes is a  70 y.o. female here for a follow up visit for        Kirstin Hughes is a  70 y.o. female here for a follow up visit for abdominal pain, IBS-D, Riddle's esophagus no dysplasia, Crohn's disease in remission, heartburn     HPI Comments: Kirstin Hughes is a 68 y.o. female who presents in follow-up for Riddle's esophagus, Colonic Crohn's disease, irritable bowel syndrome, heartburn, nausea, colon polyps. Colonoscopy and EGD in August 2017 showed  Riddle's esophagus without dysplasia. Her Crohn's disease is been in remission for greater than 5 years. We do colonoscopy every 2 years for surveillance.    Colonoscopy and EGD jan 2020 showed one small tubular adenoma, Crohn's disease in remission, surveillance biopsies negative for dysplasia  EGD showed irregular Z line with Riddle's esophagus no dysplasia small hiatal hernia  No evidence of celiac disease     Her irritable bowel syndrome with diarrhea is in remission.  She takes Levsin prn and Elavil 25mg po qhs.  No nausea, vomiting or abdominal pain.     Regarding her Crohn's disease, she is still in remission.  No rectal bleeding.     No extraintestinal manifestations of inflammatory bowel disease  GERD is in remission with over-the-counter Nexium      Past Medical History:   Diagnosis Date   • Abnormal LFTs    • Riddle esophagus    • CREST (calcinosis, Raynaud's phenomenon, esophageal dysfunction, sclerodactyly, telangiectasia) (CMS/HCC)    • Crohn's disease (CMS/HCC)    • Diverticulitis    • Diverticulosis    • Fibromyalgia    • GERD (gastroesophageal reflux disease)    • Hypertension    • Internal hemorrhoids    • Osteoarthritis    • Sleep apnea    • Thyroid disease        Past Surgical History:   Procedure Laterality Date   • BILATERAL BREAST REDUCTION     • CATARACT EXTRACTION     • CERVICAL DISC SURGERY     • COLON RESECTION      DIVERTICULITIS 2007 - [place]   •  COLONOSCOPY      NBIH, tics   • COLONOSCOPY N/A 2017    Diverticulosis, IH, Crohn's in remission, Path benign   • COLONOSCOPY N/A 2020    Procedure: COLONOSCOPY  TO ANASTOMOSIS INTO CECUM AND NORMAL TI WITH BX;  Surgeon: Lamont Milian MD;  Location:  [facility];  Service: Gastroenterology   • ENDOSCOPY N/A 2017    Z-line irreg, Path benign   • ENDOSCOPY N/A 2020    Procedure: ESOPHAGOGASTRODUODENOSCOPY WITH BX;  Surgeon: Lamont Milian MD;  [facility];  Service: Gastroenterology   • HYSTERECTOMY     • REDUCTION MAMMAPLASTY     • SALIVARY GLAND EXCISION     • THYROIDECTOMY, PARTIAL     • TONSILLECTOMY     • UPPER GASTROINTESTINAL ENDOSCOPY  2015    z line irrefgular       Scheduled Meds:    Continuous Infusions:  No current facility-administered medications for this visit.     PRN Meds:.    Allergies   Allergen Reactions   • Flagyl [Metronidazole] Nausea Only   • Levaquin [Levofloxacin] Nausea Only       Social History     Socioeconomic History   • Marital status:      Spouse name: Not on file   • Number of children: Not on file   • Years of education: Not on file   • Highest education level: Not on file   Tobacco Use   • Smoking status: Former Smoker     Types: Cigarettes     Last attempt to quit:      Years since quittin.5   • Smokeless tobacco: Never Used   Substance and Sexual Activity   • Alcohol use: No   • Drug use: No       Family History   Problem Relation Age of Onset   • Colon polyps Father    • Pancreatic cancer Paternal Grandmother    • Colon cancer Maternal Grandfather    • Breast cancer Sister        Review of Systems   Gastrointestinal: Positive for abdominal distention, abdominal pain, diarrhea and nausea.   All other systems reviewed and are negative.      There were no vitals filed for this visit.    Physical Exam   Constitutional: She is oriented to person, place, and time. She appears well-developed and well-nourished.   HENT:    Head: Normocephalic and atraumatic.   Eyes: Pupils are equal, round, and reactive to light.   Cardiovascular: Normal rate, regular rhythm and normal heart sounds.   Pulmonary/Chest: Effort normal and breath sounds normal.   Abdominal: Soft. Bowel sounds are normal. She exhibits no shifting dullness, no distension, no pulsatile liver, no fluid wave, no abdominal bruit, no ascites, no pulsatile midline mass and no mass. There is no hepatosplenomegaly. There is no tenderness. There is no rigidity and no guarding. No hernia.   Musculoskeletal: Normal range of motion.   Neurological: She is alert and oriented to person, place, and time.   Skin: Skin is warm and dry.   Psychiatric: She has a normal mood and affect. Her behavior is normal. Thought content normal.   Nursing note and vitals reviewed.      Problem list     GERD  Riddle's esophagus  IBS-D  Crohn's disease  Chronic abdominal pain        Assessment/Plan     Elavil 25 mg by mouth daily at bedtime prn  Levsin as needed  Nexium over-the-counter to be taken in the evening to prevent nighttime reflux  She may need an over-the-counter Nexium in the morning if she has daytime reflux  Colonoscopy and EGD for surveillance for Crohn's disease jan 2022  Return to clinic 6 months  Rheumatology following for fibromyalgia

## 2020-08-28 RX ORDER — POTASSIUM CHLORIDE 750 MG/1
TABLET, FILM COATED, EXTENDED RELEASE ORAL
Qty: 120 TABLET | Refills: 2 | Status: SHIPPED | OUTPATIENT
Start: 2020-08-28 | End: 2021-01-05

## 2020-10-06 RX ORDER — AMITRIPTYLINE HYDROCHLORIDE 25 MG/1
TABLET, FILM COATED ORAL
Qty: 60 TABLET | Refills: 4 | Status: SHIPPED | OUTPATIENT
Start: 2020-10-06 | End: 2021-05-13

## 2020-10-22 ENCOUNTER — TRANSCRIBE ORDERS (OUTPATIENT)
Dept: ADMINISTRATIVE | Facility: HOSPITAL | Age: 70
End: 2020-10-22

## 2020-10-22 DIAGNOSIS — Z12.31 SCREENING MAMMOGRAM FOR HIGH-RISK PATIENT: Primary | ICD-10-CM

## 2020-11-16 ENCOUNTER — TRANSCRIBE ORDERS (OUTPATIENT)
Dept: ADMINISTRATIVE | Facility: HOSPITAL | Age: 70
End: 2020-11-16

## 2020-11-16 DIAGNOSIS — R06.00 DYSPNEA, UNSPECIFIED TYPE: ICD-10-CM

## 2020-11-16 DIAGNOSIS — M34.9 SCLERODERMA (HCC): Primary | ICD-10-CM

## 2020-12-21 ENCOUNTER — OFFICE VISIT (OUTPATIENT)
Dept: GASTROENTEROLOGY | Facility: CLINIC | Age: 70
End: 2020-12-21

## 2020-12-21 VITALS — WEIGHT: 180.2 LBS | HEIGHT: 61 IN | BODY MASS INDEX: 34.02 KG/M2

## 2020-12-21 DIAGNOSIS — K58.9 IRRITABLE BOWEL SYNDROME WITHOUT DIARRHEA: ICD-10-CM

## 2020-12-21 DIAGNOSIS — R14.0 ABDOMINAL BLOATING: ICD-10-CM

## 2020-12-21 DIAGNOSIS — K22.70 BARRETT'S ESOPHAGUS WITHOUT DYSPLASIA: Primary | ICD-10-CM

## 2020-12-21 DIAGNOSIS — K21.9 GASTROESOPHAGEAL REFLUX DISEASE WITHOUT ESOPHAGITIS: ICD-10-CM

## 2020-12-21 DIAGNOSIS — K50.10 CROHN'S DISEASE OF LARGE INTESTINE WITHOUT COMPLICATION (HCC): ICD-10-CM

## 2020-12-21 PROCEDURE — 99213 OFFICE O/P EST LOW 20 MIN: CPT | Performed by: INTERNAL MEDICINE

## 2020-12-21 NOTE — PROGRESS NOTES
Chief Complaint   Patient presents with   • Riddle's esophagus           Kirstin Hughes is a  70 y.o. female here for a follow up visit for abdominal pain, IBS-D, Riddle's esophagus no dysplasia, Crohn's disease in remission, heartburn     HPI Comments: Kirstin Hughes is a 70 y.o. female who presents in follow-up for Riddle's esophagus, Colonic Crohn's disease, irritable bowel syndrome, heartburn, nausea, colon polyps. Colonoscopy and EGD in August 2017 showed  Riddle's esophagus without dysplasia. Her Crohn's disease is been in remission for greater than 5 years..     Colonoscopy and EGD jan 2020 showed one small tubular adenoma, Crohn's disease in remission, surveillance biopsies negative for dysplasia  EGD showed irregular Z line with Riddle's esophagus no dysplasia small hiatal hernia  No evidence of celiac disease     Her irritable bowel syndrome with diarrhea is in remission.  She takes Levsin prn and Elavil 25mg po qhs.  No nausea, vomiting or abdominal pain.     Regarding her Crohn's disease, she is still in remission.  No rectal bleeding.     No extraintestinal manifestations of inflammatory bowel disease  GERD is in remission with over-the-counter Nexium      Past Medical History:   Diagnosis Date   • Abnormal LFTs    • Riddle esophagus    • CREST (calcinosis, Raynaud's phenomenon, esophageal dysfunction, sclerodactyly, telangiectasia) (CMS/HCC)    • Crohn's disease (CMS/HCC)    • Diverticulitis    • Diverticulosis    • Fibromyalgia    • GERD (gastroesophageal reflux disease)    • Hypertension    • Internal hemorrhoids    • Osteoarthritis    • Sleep apnea    • Thyroid disease        Past Surgical History:   Procedure Laterality Date   • BILATERAL BREAST REDUCTION     • CATARACT EXTRACTION     • CERVICAL DISC SURGERY     • COLON RESECTION      DIVERTICULITIS 2007 - [place]   • COLONOSCOPY  8/20015    NBIH, tics   • COLONOSCOPY N/A 9/18/2017    Diverticulosis, IH, Crohn's in remission, Path benign    • COLONOSCOPY N/A 2020    Procedure: COLONOSCOPY  TO ANASTOMOSIS INTO CECUM AND NORMAL TI WITH BX;  Surgeon: Lamont Milian MD;  Location: [facility];  Service: Gastroenterology   • ENDOSCOPY N/A 2017    Z-line irreg, Path benign   • ENDOSCOPY N/A 2020    Procedure: ESOPHAGOGASTRODUODENOSCOPY WITH BX;  Surgeon: Lamont Milian MD;  Location: [facility];  Service: Gastroenterology   • HYSTERECTOMY     • REDUCTION MAMMAPLASTY     • SALIVARY GLAND EXCISION     • THYROIDECTOMY, PARTIAL     • TONSILLECTOMY     • UPPER GASTROINTESTINAL ENDOSCOPY  2015    z line irrefgular       Scheduled Meds:    Continuous Infusions:No current facility-administered medications for this visit.       PRN Meds:.    Allergies   Allergen Reactions   • Flagyl [Metronidazole] Nausea Only   • Levaquin [Levofloxacin] Nausea Only       Social History     Socioeconomic History   • Marital status:      Spouse name: Not on file   • Number of children: Not on file   • Years of education: Not on file   • Highest education level: Not on file   Tobacco Use   • Smoking status: Former Smoker     Types: Cigarettes     Quit date:      Years since quittin.9   • Smokeless tobacco: Never Used   Substance and Sexual Activity   • Alcohol use: No   • Drug use: No       Family History   Problem Relation Age of Onset   • Colon polyps Father    • Pancreatic cancer Paternal Grandmother    • Colon cancer Maternal Grandfather    • Breast cancer Sister        Review of Systems   Gastrointestinal: Negative for abdominal distention, anal bleeding, nausea and vomiting.   All other systems reviewed and are negative.      There were no vitals filed for this visit.    Physical Exam  Vitals signs and nursing note reviewed.   Constitutional:       Appearance: She is well-developed.   HENT:      Head: Normocephalic and atraumatic.   Eyes:      Pupils: Pupils are equal, round, and reactive to light.   Cardiovascular:      Rate and  Rhythm: Normal rate and regular rhythm.      Heart sounds: Normal heart sounds.   Pulmonary:      Effort: Pulmonary effort is normal.      Breath sounds: Normal breath sounds.   Abdominal:      General: Bowel sounds are normal. There is no distension or abdominal bruit.      Palpations: Abdomen is soft. Abdomen is not rigid. There is no shifting dullness, fluid wave, mass or pulsatile mass.      Tenderness: There is no abdominal tenderness. There is no guarding.      Hernia: No hernia is present.   Musculoskeletal: Normal range of motion.   Skin:     General: Skin is warm and dry.   Neurological:      Mental Status: She is alert and oriented to person, place, and time.   Psychiatric:         Behavior: Behavior normal.         Thought Content: Thought content normal.         No radiology results for the last 7 days    Problem list     GERD  Riddle's esophagus  IBS-D  Crohn's disease  Chronic abdominal pain        Assessment/Plan     Elavil 25 mg by mouth daily at bedtime, 50 was too strong  Levsin as needed  Nexium over-the-counter to be taken in the evening to prevent nighttime reflux  She may need an over-the-counter Nexium in the morning if she has daytime reflux  Colonoscopy and EGD for surveillance for Crohn's disease jan 2022  Return to clinic 6 months  Rheumatology following for fibromyalgia

## 2021-01-05 RX ORDER — POTASSIUM CHLORIDE 750 MG/1
TABLET, FILM COATED, EXTENDED RELEASE ORAL
Qty: 120 TABLET | Refills: 1 | Status: SHIPPED | OUTPATIENT
Start: 2021-01-05 | End: 2021-03-22

## 2021-01-18 ENCOUNTER — TRANSCRIBE ORDERS (OUTPATIENT)
Dept: SLEEP MEDICINE | Facility: HOSPITAL | Age: 71
End: 2021-01-18

## 2021-01-18 DIAGNOSIS — Z01.818 OTHER SPECIFIED PRE-OPERATIVE EXAMINATION: Primary | ICD-10-CM

## 2021-01-19 ENCOUNTER — LAB (OUTPATIENT)
Dept: LAB | Facility: HOSPITAL | Age: 71
End: 2021-01-19

## 2021-01-19 DIAGNOSIS — Z01.818 OTHER SPECIFIED PRE-OPERATIVE EXAMINATION: ICD-10-CM

## 2021-01-19 PROCEDURE — C9803 HOPD COVID-19 SPEC COLLECT: HCPCS

## 2021-01-19 PROCEDURE — U0004 COV-19 TEST NON-CDC HGH THRU: HCPCS

## 2021-01-20 LAB — SARS-COV-2 RNA RESP QL NAA+PROBE: NOT DETECTED

## 2021-01-21 ENCOUNTER — HOSPITAL ENCOUNTER (OUTPATIENT)
Dept: GENERAL RADIOLOGY | Facility: HOSPITAL | Age: 71
Discharge: HOME | End: 2021-01-21

## 2021-01-21 ENCOUNTER — HOSPITAL ENCOUNTER (OUTPATIENT)
Dept: CARDIOLOGY | Facility: HOSPITAL | Age: 71
Discharge: HOME | End: 2021-01-21

## 2021-01-21 ENCOUNTER — HOSPITAL ENCOUNTER (OUTPATIENT)
Dept: RESPIRATORY THERAPY | Facility: HOSPITAL | Age: 71
Discharge: HOME | End: 2021-01-21

## 2021-01-21 VITALS — HEIGHT: 61 IN | BODY MASS INDEX: 33.99 KG/M2 | WEIGHT: 180 LBS

## 2021-01-21 DIAGNOSIS — R06.00 DYSPNEA, UNSPECIFIED TYPE: ICD-10-CM

## 2021-01-21 DIAGNOSIS — M34.9 SCLERODERMA (HCC): ICD-10-CM

## 2021-01-21 DIAGNOSIS — R76.8 OTHER SPECIFIED ABNORMAL IMMUNOLOGICAL FINDINGS IN SERUM: ICD-10-CM

## 2021-01-21 DIAGNOSIS — M34.9 SYSTEMIC SCLEROSIS, UNSPECIFIED (HCC): ICD-10-CM

## 2021-01-21 DIAGNOSIS — R06.02 SHORTNESS OF BREATH: ICD-10-CM

## 2021-01-21 LAB
AORTIC DIMENSIONLESS INDEX: 0.9 (DI)
BH CV ECHO MEAS - ACS: 1.7 CM
BH CV ECHO MEAS - AO MAX PG (FULL): 1.6 MMHG
BH CV ECHO MEAS - AO MAX PG: 8 MMHG
BH CV ECHO MEAS - AO MEAN PG (FULL): 1 MMHG
BH CV ECHO MEAS - AO MEAN PG: 4 MMHG
BH CV ECHO MEAS - AO ROOT AREA (BSA CORRECTED): 1.7
BH CV ECHO MEAS - AO ROOT AREA: 7.1 CM^2
BH CV ECHO MEAS - AO ROOT DIAM: 3 CM
BH CV ECHO MEAS - AO V2 MAX: 141 CM/SEC
BH CV ECHO MEAS - AO V2 MEAN: 93 CM/SEC
BH CV ECHO MEAS - AO V2 VTI: 30.4 CM
BH CV ECHO MEAS - ASC AORTA: 2.6 CM
BH CV ECHO MEAS - AVA(I,A): 2.3 CM^2
BH CV ECHO MEAS - AVA(I,D): 2.3 CM^2
BH CV ECHO MEAS - AVA(V,A): 2.3 CM^2
BH CV ECHO MEAS - AVA(V,D): 2.3 CM^2
BH CV ECHO MEAS - BSA(HAYCOCK): 1.9 M^2
BH CV ECHO MEAS - BSA: 1.8 M^2
BH CV ECHO MEAS - BZI_BMI: 34 KILOGRAMS/M^2
BH CV ECHO MEAS - BZI_METRIC_HEIGHT: 154.9 CM
BH CV ECHO MEAS - BZI_METRIC_WEIGHT: 81.6 KG
BH CV ECHO MEAS - CONTRAST EF 4CH: 67.5 CM2
BH CV ECHO MEAS - EDV(CUBED): 64 ML
BH CV ECHO MEAS - EDV(MOD-SP2): 56 ML
BH CV ECHO MEAS - EDV(MOD-SP4): 80 ML
BH CV ECHO MEAS - EDV(TEICH): 70 ML
BH CV ECHO MEAS - EF(CUBED): 69.2 %
BH CV ECHO MEAS - EF(MOD-BP): 68 %
BH CV ECHO MEAS - EF(MOD-SP2): 62.5 %
BH CV ECHO MEAS - EF(MOD-SP4): 70 %
BH CV ECHO MEAS - EF(TEICH): 61.4 %
BH CV ECHO MEAS - ESV(CUBED): 19.7 ML
BH CV ECHO MEAS - ESV(MOD-SP2): 21 ML
BH CV ECHO MEAS - ESV(MOD-SP4): 24 ML
BH CV ECHO MEAS - ESV(TEICH): 27 ML
BH CV ECHO MEAS - FS: 32.5 %
BH CV ECHO MEAS - IVS/LVPW: 0.69
BH CV ECHO MEAS - IVSD: 0.9 CM
BH CV ECHO MEAS - LAT PEAK E' VEL: 8.3 CM/SEC
BH CV ECHO MEAS - LV DIASTOLIC VOL/BSA (35-75): 44.3 ML/M^2
BH CV ECHO MEAS - LV MASS(C)D: 145.6 GRAMS
BH CV ECHO MEAS - LV MASS(C)DI: 80.6 GRAMS/M^2
BH CV ECHO MEAS - LV MAX PG: 6.4 MMHG
BH CV ECHO MEAS - LV MEAN PG: 3 MMHG
BH CV ECHO MEAS - LV SYSTOLIC VOL/BSA (12-30): 13.3 ML/M^2
BH CV ECHO MEAS - LV V1 MAX: 126 CM/SEC
BH CV ECHO MEAS - LV V1 MEAN: 76.4 CM/SEC
BH CV ECHO MEAS - LV V1 VTI: 27.6 CM
BH CV ECHO MEAS - LVIDD: 4 CM
BH CV ECHO MEAS - LVIDS: 2.7 CM
BH CV ECHO MEAS - LVLD AP2: 6.4 CM
BH CV ECHO MEAS - LVLD AP4: 7.2 CM
BH CV ECHO MEAS - LVLS AP2: 5.2 CM
BH CV ECHO MEAS - LVLS AP4: 5.5 CM
BH CV ECHO MEAS - LVOT AREA (M): 2.5 CM^2
BH CV ECHO MEAS - LVOT AREA: 2.5 CM^2
BH CV ECHO MEAS - LVOT DIAM: 1.8 CM
BH CV ECHO MEAS - LVPWD: 1.3 CM
BH CV ECHO MEAS - MED PEAK E' VEL: 6.4 CM/SEC
BH CV ECHO MEAS - MV A DUR: 0.13 SEC
BH CV ECHO MEAS - MV A MAX VEL: 72.3 CM/SEC
BH CV ECHO MEAS - MV DEC SLOPE: 460 CM/SEC^2
BH CV ECHO MEAS - MV DEC TIME: 0.19 SEC
BH CV ECHO MEAS - MV E MAX VEL: 62.1 CM/SEC
BH CV ECHO MEAS - MV E/A: 0.86
BH CV ECHO MEAS - MV MAX PG: 3.8 MMHG
BH CV ECHO MEAS - MV MEAN PG: 1 MMHG
BH CV ECHO MEAS - MV P1/2T MAX VEL: 92.3 CM/SEC
BH CV ECHO MEAS - MV P1/2T: 58.8 MSEC
BH CV ECHO MEAS - MV V2 MAX: 97.6 CM/SEC
BH CV ECHO MEAS - MV V2 MEAN: 48.5 CM/SEC
BH CV ECHO MEAS - MV V2 VTI: 24.2 CM
BH CV ECHO MEAS - MVA P1/2T LCG: 2.4 CM^2
BH CV ECHO MEAS - MVA(P1/2T): 3.7 CM^2
BH CV ECHO MEAS - MVA(VTI): 2.9 CM^2
BH CV ECHO MEAS - PA ACC TIME: 0.13 SEC
BH CV ECHO MEAS - PA MAX PG (FULL): 0.93 MMHG
BH CV ECHO MEAS - PA MAX PG: 3.7 MMHG
BH CV ECHO MEAS - PA PR(ACCEL): 18.7 MMHG
BH CV ECHO MEAS - PA V2 MAX: 96.4 CM/SEC
BH CV ECHO MEAS - PULM A REVS DUR: 0.16 SEC
BH CV ECHO MEAS - PULM A REVS VEL: 33.5 CM/SEC
BH CV ECHO MEAS - PULM DIAS VEL: 39.8 CM/SEC
BH CV ECHO MEAS - PULM S/D: 1.6
BH CV ECHO MEAS - PULM SYS VEL: 65.5 CM/SEC
BH CV ECHO MEAS - RV MAX PG: 2.8 MMHG
BH CV ECHO MEAS - RV MEAN PG: 1 MMHG
BH CV ECHO MEAS - RV V1 MAX: 83.5 CM/SEC
BH CV ECHO MEAS - RV V1 MEAN: 55.5 CM/SEC
BH CV ECHO MEAS - RV V1 VTI: 18.9 CM
BH CV ECHO MEAS - SI(AO): 119 ML/M^2
BH CV ECHO MEAS - SI(CUBED): 24.5 ML/M^2
BH CV ECHO MEAS - SI(LVOT): 38.9 ML/M^2
BH CV ECHO MEAS - SI(MOD-SP2): 19.4 ML/M^2
BH CV ECHO MEAS - SI(MOD-SP4): 31 ML/M^2
BH CV ECHO MEAS - SI(TEICH): 23.8 ML/M^2
BH CV ECHO MEAS - SV(AO): 214.9 ML
BH CV ECHO MEAS - SV(CUBED): 44.3 ML
BH CV ECHO MEAS - SV(LVOT): 70.2 ML
BH CV ECHO MEAS - SV(MOD-SP2): 35 ML
BH CV ECHO MEAS - SV(MOD-SP4): 56 ML
BH CV ECHO MEAS - SV(TEICH): 43 ML
BH CV ECHO MEAS - TAPSE (>1.6): 2.2 CM
BH CV ECHO MEASUREMENTS AVERAGE E/E' RATIO: 8.45
BH CV XLRA - RV BASE: 3.5 CM
BH CV XLRA - TDI S': 14.4 CM/SEC
LEFT ATRIUM VOLUME INDEX: 13.6 ML/M2
MAXIMAL PREDICTED HEART RATE: 149 BPM
STRESS TARGET HR: 127 BPM

## 2021-01-21 PROCEDURE — 93306 TTE W/DOPPLER COMPLETE: CPT | Performed by: INTERNAL MEDICINE

## 2021-01-21 PROCEDURE — 94726 PLETHYSMOGRAPHY LUNG VOLUMES: CPT

## 2021-01-21 PROCEDURE — 94729 DIFFUSING CAPACITY: CPT

## 2021-01-21 PROCEDURE — 71046 X-RAY EXAM CHEST 2 VIEWS: CPT

## 2021-01-21 PROCEDURE — 94010 BREATHING CAPACITY TEST: CPT

## 2021-01-21 PROCEDURE — 93306 TTE W/DOPPLER COMPLETE: CPT

## 2021-01-21 PROCEDURE — 25010000002 PERFLUTREN (DEFINITY) 8.476 MG IN SODIUM CHLORIDE (PF) 0.9 % 10 ML INJECTION: Performed by: NURSE PRACTITIONER

## 2021-01-21 RX ADMIN — SODIUM CHLORIDE 2 ML: 9 INJECTION INTRAMUSCULAR; INTRAVENOUS; SUBCUTANEOUS at 14:19

## 2021-01-28 ENCOUNTER — HOSPITAL ENCOUNTER (OUTPATIENT)
Dept: MAMMOGRAPHY | Facility: HOSPITAL | Age: 71
Discharge: HOME | End: 2021-01-28
Admitting: FAMILY MEDICINE

## 2021-01-28 DIAGNOSIS — Z12.31 SCREENING MAMMOGRAM FOR HIGH-RISK PATIENT: ICD-10-CM

## 2021-01-28 PROCEDURE — 77067 SCR MAMMO BI INCL CAD: CPT

## 2021-01-28 PROCEDURE — 77063 BREAST TOMOSYNTHESIS BI: CPT

## 2021-02-26 ENCOUNTER — TELEPHONE (OUTPATIENT)
Dept: GASTROENTEROLOGY | Facility: CLINIC | Age: 71
End: 2021-02-26

## 2021-03-22 RX ORDER — POTASSIUM CHLORIDE 750 MG/1
TABLET, FILM COATED, EXTENDED RELEASE ORAL
Qty: 120 TABLET | Refills: 0 | Status: SHIPPED | OUTPATIENT
Start: 2021-03-22 | End: 2021-04-19

## 2021-04-19 RX ORDER — POTASSIUM CHLORIDE 750 MG/1
TABLET, FILM COATED, EXTENDED RELEASE ORAL
Qty: 120 TABLET | Refills: 0 | Status: SHIPPED | OUTPATIENT
Start: 2021-04-19 | End: 2021-07-19

## 2021-05-13 RX ORDER — AMITRIPTYLINE HYDROCHLORIDE 25 MG/1
25 TABLET, FILM COATED ORAL NIGHTLY
Qty: 60 TABLET | Refills: 3 | Status: SHIPPED | OUTPATIENT
Start: 2021-05-13 | End: 2021-12-13 | Stop reason: SDUPTHER

## 2021-06-21 ENCOUNTER — OFFICE VISIT (OUTPATIENT)
Dept: GASTROENTEROLOGY | Facility: CLINIC | Age: 71
End: 2021-06-21

## 2021-06-21 VITALS — TEMPERATURE: 98.6 F | BODY MASS INDEX: 33.42 KG/M2 | HEIGHT: 61 IN | WEIGHT: 177 LBS

## 2021-06-21 DIAGNOSIS — K50.10 CROHN'S DISEASE OF LARGE INTESTINE WITHOUT COMPLICATION (HCC): ICD-10-CM

## 2021-06-21 DIAGNOSIS — K58.9 IRRITABLE BOWEL SYNDROME WITHOUT DIARRHEA: ICD-10-CM

## 2021-06-21 DIAGNOSIS — K21.9 GASTROESOPHAGEAL REFLUX DISEASE WITHOUT ESOPHAGITIS: ICD-10-CM

## 2021-06-21 DIAGNOSIS — R14.0 ABDOMINAL BLOATING: ICD-10-CM

## 2021-06-21 DIAGNOSIS — K22.70 BARRETT'S ESOPHAGUS WITHOUT DYSPLASIA: Primary | ICD-10-CM

## 2021-06-21 DIAGNOSIS — R12 HEARTBURN: ICD-10-CM

## 2021-06-21 PROCEDURE — 99214 OFFICE O/P EST MOD 30 MIN: CPT | Performed by: INTERNAL MEDICINE

## 2021-06-21 RX ORDER — LORATADINE 10 MG/1
10 TABLET ORAL DAILY
COMMUNITY

## 2021-06-21 RX ORDER — MILNACIPRAN HCL 12.5 MG
TABLET ORAL
COMMUNITY
Start: 2021-06-07 | End: 2021-12-13

## 2021-06-21 NOTE — PROGRESS NOTES
CC: barretts esophagus      Subjective     HPI  Kirstin Hughes is a 71 y.o. female who presents today for follow up.    71 y.o.  here for a follow up visit for abdominal pain, IBS-D, Riddle's esophagus no dysplasia, Crohn's disease in remission, heartburn      presents in follow-up for Riddle's esophagus, Colonic Crohn's disease, irritable bowel syndrome, heartburn, nausea, colon polyps.     Colonoscopy and EGD in August 2017 showed  Riddle's esophagus without dysplasia. Her Crohn's disease is been in remission for greater than 5 years..     Colonoscopy and EGD jan 2020 showed one small tubular adenoma, Crohn's disease in remission, surveillance biopsies negative for dysplasia  EGD showed irregular Z line with Riddle's esophagus no dysplasia small hiatal hernia  No evidence of celiac disease     Her irritable bowel syndrome with diarrhea is in remission.  She takes Levsin prn and Elavil 25mg po qhs.  No nausea, vomiting or abdominal pain.     Regarding her Crohn's disease, she is still in remission.  No rectal bleeding.     No extraintestinal manifestations of inflammatory bowel disease  GERD is in remission with over-the-counter Nexium    She is doing quite well, minimal abdominal pain and diarrhea minimal frequency and urgency  No alarm symptoms  Minimal abdominal pain and she takes Levsin  She is on 25 mg of Elavil at night and she is just started Savella for fibromyalgia      Objective   There were no vitals filed for this visit.    Physical Exam  The following data was reviewed by: Lamont Milian MD on 06/21/2021:              Data reviewed: Consultant notes I have reviewed Dr. Greenberg's note from the rheumatology division and GI studies I have reviewed colonoscopy and EGD and they can be described above     Assessment/Plan   Assessment:   GERD  Riddle's esophagus  IBS-D  Crohn's disease  Chronic abdominal pain              Plan:          Elavil 25 mg by mouth daily at bedtime, 50 was too strong  (drowsiness)  Levsin as needed  Nexium every morning and then nightly as needed  Colonoscopy and EGD for surveillance for Crohn's disease jan 2022  Return to clinic 6 months  Rheumatology following for fibromyalgia, she has just begun Savella  GERD lifestyle modifications discussed in great detail      I spent 34 minutes caring for Kirstin on this date of service. This time includes time spent by me in the following activities:preparing for the visit, reviewing tests, obtaining and/or reviewing a separately obtained history, performing a medically appropriate examination and/or evaluation , counseling and educating the patient/family/caregiver, ordering medications, tests, or procedures, referring and communicating with other health care professionals , documenting information in the medical record, independently interpreting results and communicating that information with the patient/family/caregiver and care coordination    Lamont Milian MD  [facility]  [address] [place] [address]  Office: [phone]

## 2021-07-19 RX ORDER — POTASSIUM CHLORIDE 750 MG/1
TABLET, FILM COATED, EXTENDED RELEASE ORAL
Qty: 120 TABLET | Refills: 0 | Status: SHIPPED | OUTPATIENT
Start: 2021-07-19 | End: 2021-09-13

## 2021-09-13 RX ORDER — POTASSIUM CHLORIDE 750 MG/1
TABLET, FILM COATED, EXTENDED RELEASE ORAL
Qty: 120 TABLET | Refills: 0 | Status: SHIPPED | OUTPATIENT
Start: 2021-09-13 | End: 2021-10-23

## 2021-10-23 RX ORDER — POTASSIUM CHLORIDE 750 MG/1
TABLET, FILM COATED, EXTENDED RELEASE ORAL
Qty: 120 TABLET | Refills: 0 | Status: SHIPPED | OUTPATIENT
Start: 2021-10-23 | End: 2021-12-13 | Stop reason: SDUPTHER

## 2021-12-13 ENCOUNTER — OFFICE VISIT (OUTPATIENT)
Dept: GASTROENTEROLOGY | Facility: CLINIC | Age: 71
End: 2021-12-13

## 2021-12-13 VITALS — BODY MASS INDEX: 32.65 KG/M2 | TEMPERATURE: 97.3 F | WEIGHT: 172.9 LBS | HEIGHT: 61 IN

## 2021-12-13 DIAGNOSIS — R12 HEARTBURN: ICD-10-CM

## 2021-12-13 DIAGNOSIS — K22.70 BARRETT'S ESOPHAGUS WITHOUT DYSPLASIA: Primary | ICD-10-CM

## 2021-12-13 DIAGNOSIS — K50.10 CROHN'S DISEASE OF LARGE INTESTINE WITHOUT COMPLICATION (HCC): ICD-10-CM

## 2021-12-13 DIAGNOSIS — R14.0 ABDOMINAL BLOATING: ICD-10-CM

## 2021-12-13 DIAGNOSIS — K59.01 SLOW TRANSIT CONSTIPATION: ICD-10-CM

## 2021-12-13 PROCEDURE — 99214 OFFICE O/P EST MOD 30 MIN: CPT | Performed by: INTERNAL MEDICINE

## 2021-12-13 RX ORDER — AMITRIPTYLINE HYDROCHLORIDE 25 MG/1
25 TABLET, FILM COATED ORAL NIGHTLY
Qty: 60 TABLET | Refills: 12 | Status: SHIPPED | OUTPATIENT
Start: 2021-12-13 | End: 2023-02-13

## 2021-12-13 RX ORDER — POTASSIUM CHLORIDE 750 MG/1
10 TABLET, FILM COATED, EXTENDED RELEASE ORAL 4 TIMES DAILY
Qty: 120 TABLET | Refills: 12 | Status: SHIPPED | OUTPATIENT
Start: 2021-12-13

## 2021-12-13 NOTE — PROGRESS NOTES
Chief Complaint   Patient presents with   • Follow-up     liu's esophagus     Subjective     HPI  Kirstin Hughes is a 71 y.o. female who presents today for follow up.      71 y.o.  here for a follow up visit for abdominal pain, IBS-D, Liu's esophagus no dysplasia, Crohn's disease in remission, heartburn      presents in follow-up for Liu's esophagus, Colonic Crohn's disease, irritable bowel syndrome, heartburn, nausea, colon polyps.      Colonoscopy and EGD in August 2017 showed  Liu's esophagus without dysplasia. Her Crohn's disease is been in remission for greater than 5 years..     Colonoscopy and EGD jan 2020 showed one small tubular adenoma, Crohn's disease in remission, surveillance biopsies negative for dysplasia  EGD showed irregular Z line with Liu's esophagus no dysplasia small hiatal hernia  No evidence of celiac disease     Her irritable bowel syndrome with diarrhea is in remission.  She takes Levsin prn and Elavil 25mg po qhs.  No nausea, vomiting or abdominal pain.     Regarding her Crohn's disease, she is still in remission.  No rectal bleeding.     No extraintestinal manifestations of inflammatory bowel disease  GERD is in remission with over-the-counter Nexium     She is doing quite well, minimal abdominal pain and diarrhea minimal frequency and urgency  No alarm symptoms  Minimal abdominal pain and she takes Levsin  She is on 25 mg of Elavil at night and she is just started Savella for fibromyalgia        Objective   Vitals:    12/13/21 1542   Temp: 97.3 °F (36.3 °C)       Physical Exam  Vitals reviewed.   Constitutional:       Appearance: She is well-developed.   HENT:      Head: Normocephalic and atraumatic.   Abdominal:      General: Bowel sounds are normal. There is no distension.      Palpations: Abdomen is soft. There is no mass.      Tenderness: There is no abdominal tenderness.      Hernia: No hernia is present.   Skin:     General: Skin is warm and dry.    Neurological:      Mental Status: She is alert and oriented to person, place, and time.   Psychiatric:         Behavior: Behavior normal.         Thought Content: Thought content normal.         Judgment: Judgment normal.       Colonoscopy and EGD 2020 results above        Assessment:   GERD  Riddle's esophagus  IBS-D  Crohn's disease  Chronic abdominal pain        Plan:         Elavil 25 mg by mouth daily at bedtime, she will now try 50 mg to see if there are any side effects.  Her cramping is worse and this should help with her cramping  Levsin as needed  Nexium every morning and then nightly as needed  Colonoscopy and EGD for surveillance for Crohn's disease oct  2022  Return to clinic 6 months  Rheumatology following for fibromyalgia  GERD lifestyle modifications discussed in great detail  Lasix 40 mg p.o. every morning for lower extremity swelling, potassium chloride 40 mg once a day.  She has her potassium checked by her PCP along with all of her other labs and they have been normal          Lamont Milian MD  [facility]  [address] [place] [address]  Office: [phone]

## 2021-12-23 ENCOUNTER — TRANSCRIBE ORDERS (OUTPATIENT)
Dept: ADMINISTRATIVE | Facility: HOSPITAL | Age: 71
End: 2021-12-23

## 2021-12-23 DIAGNOSIS — Z92.89 HISTORY OF MAMMOGRAPHY, SCREENING: Primary | ICD-10-CM

## 2022-03-21 ENCOUNTER — HOSPITAL ENCOUNTER (OUTPATIENT)
Dept: MAMMOGRAPHY | Facility: HOSPITAL | Age: 72
Discharge: HOME | End: 2022-03-21
Admitting: FAMILY MEDICINE

## 2022-03-21 DIAGNOSIS — Z92.89 HISTORY OF MAMMOGRAPHY, SCREENING: ICD-10-CM

## 2022-03-21 PROCEDURE — 77063 BREAST TOMOSYNTHESIS BI: CPT

## 2022-03-21 PROCEDURE — 77067 SCR MAMMO BI INCL CAD: CPT

## 2022-07-26 ENCOUNTER — OFFICE VISIT (OUTPATIENT)
Dept: GASTROENTEROLOGY | Facility: CLINIC | Age: 72
End: 2022-07-26

## 2022-07-26 VITALS
WEIGHT: 171.6 LBS | TEMPERATURE: 96.8 F | BODY MASS INDEX: 32.4 KG/M2 | DIASTOLIC BLOOD PRESSURE: 87 MMHG | HEIGHT: 61 IN | SYSTOLIC BLOOD PRESSURE: 167 MMHG

## 2022-07-26 DIAGNOSIS — R10.10 PAIN OF UPPER ABDOMEN: ICD-10-CM

## 2022-07-26 DIAGNOSIS — K22.70 BARRETT'S ESOPHAGUS WITHOUT DYSPLASIA: ICD-10-CM

## 2022-07-26 DIAGNOSIS — Z12.11 ENCOUNTER FOR SCREENING FOR MALIGNANT NEOPLASM OF COLON: Primary | ICD-10-CM

## 2022-07-26 DIAGNOSIS — R12 HEARTBURN: ICD-10-CM

## 2022-07-26 DIAGNOSIS — K50.10 CROHN'S DISEASE OF LARGE INTESTINE WITHOUT COMPLICATION: ICD-10-CM

## 2022-07-26 PROCEDURE — 99214 OFFICE O/P EST MOD 30 MIN: CPT | Performed by: INTERNAL MEDICINE

## 2022-07-26 RX ORDER — DULOXETIN HYDROCHLORIDE 30 MG/1
CAPSULE, DELAYED RELEASE ORAL
COMMUNITY
Start: 2022-05-12

## 2022-07-26 NOTE — PROGRESS NOTES
Chief Complaint   Patient presents with   • liu's esophagus without dysplasia   • Crohn's Disease     Subjective     HPI  Kirstin Hughes is a 72 y.o. female who presents today for follow up.    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      Colonoscopy and EGD in August 2017 showed  Liu's esophagus without dysplasia. Her Crohn's disease is been in remission for greater than 5 years..     Colonoscopy and EGD jan 2020 showed one small tubular adenoma, Crohn's disease in remission, surveillance biopsies negative for dysplasia  EGD showed irregular Z line with Liu's esophagus no dysplasia small hiatal hernia  No evidence of celiac disease     Her irritable bowel syndrome with diarrhea is in remission.  She takes Levsin prn and Elavil 25mg po qhs.  No nausea, vomiting or abdominal pain.     Regarding her Crohn's disease, she is still in remission.  No rectal bleeding.     No extraintestinal manifestations of inflammatory bowel disease  GERD is in remission with over-the-counter Nexium     She is doing quite well, minimal abdominal pain and diarrhea minimal frequency and urgency  No alarm symptoms  Minimal abdominal pain and she takes Levsin  She is on 25 mg of Elavil at night and she is just started Cymbalta for fibromyalgia      Objective   Vitals:    07/26/22 1011   BP: 167/87   Temp: 96.8 °F (36 °C)       Physical Exam  Vitals reviewed.   Constitutional:       Appearance: She is well-developed.   HENT:      Head: Normocephalic and atraumatic.   Abdominal:      General: Bowel sounds are normal. There is no distension.      Palpations: Abdomen is soft. There is no mass.      Tenderness: There is no abdominal tenderness.      Hernia: No hernia is present.   Skin:     General: Skin is warm and dry.    Neurological:      Mental Status: She is alert and oriented to person, place, and time.   Psychiatric:         Behavior: Behavior normal.         Thought Content: Thought content normal.         Judgment: Judgment normal.       The following data was reviewed by: Lamont Milian MD on 07/26/2022:        Colonoscopy and EGD reports described above  I have reviewed Dr. Greenberg, rheumatologist, assessment of her fibromyalgia and his diagnostic plan and treatment    Assessment:   GERD  Riddle's esophagus  IBS-D  Crohn's disease  Chronic abdominal pain        Plan:         Elavil 25 mg by mouth daily at bedtime.   Levsin as needed  Nexium every morning   Colonoscopy and EGD for surveillance for Crohn's disease december 2022  Return to clinic 6 months  Rheumatology following for fibromyalgia  GERD lifestyle modifications discussed in great detail  Lasix 40 mg p.o. every morning for lower extremity swelling, potassium chloride 40 mg once a day.  She has her potassium checked by her PCP along with all of her other labs and they have been normal           I spent 25 minutes caring for Kirstin on this date of service. This time includes time spent by me in the following activities:preparing for the visit, reviewing tests, obtaining and/or reviewing a separately obtained history, performing a medically appropriate examination and/or evaluation , counseling and educating the patient/family/caregiver, ordering medications, tests, or procedures, referring and communicating with other health care professionals , documenting information in the medical record, independently interpreting results and communicating that information with the patient/family/caregiver and care coordination    Lamont Milian MD  [facility]  [address] [place] [address]  Office: [phone]

## 2022-09-06 ENCOUNTER — TELEPHONE (OUTPATIENT)
Dept: GASTROENTEROLOGY | Facility: CLINIC | Age: 72
End: 2022-09-06

## 2022-09-26 ENCOUNTER — TELEPHONE (OUTPATIENT)
Dept: GASTROENTEROLOGY | Facility: CLINIC | Age: 72
End: 2022-09-26

## 2022-09-26 NOTE — TELEPHONE ENCOUNTER
Hub staff attempted to follow warm transfer process and was unsuccessful     Caller: Kirstin Hughes    Relationship to patient: Self    Best call back number: [phone]    Patient is needing: TO SCHEDULE PROCEDURE WITH DR. LANGFORD. CALL BACK WOULD BE BEST IN THE AFTERNOON

## 2023-02-13 RX ORDER — AMITRIPTYLINE HYDROCHLORIDE 25 MG/1
TABLET, FILM COATED ORAL
Qty: 60 TABLET | Refills: 12 | Status: SHIPPED | OUTPATIENT
Start: 2023-02-13

## 2023-03-21 ENCOUNTER — TELEPHONE (OUTPATIENT)
Dept: GASTROENTEROLOGY | Facility: CLINIC | Age: 73
End: 2023-03-21
Payer: MEDICARE

## 2023-03-21 ENCOUNTER — OFFICE VISIT (OUTPATIENT)
Dept: GASTROENTEROLOGY | Facility: CLINIC | Age: 73
End: 2023-03-21
Payer: MEDICARE

## 2023-03-21 VITALS
HEIGHT: 61 IN | TEMPERATURE: 97.1 F | SYSTOLIC BLOOD PRESSURE: 145 MMHG | HEART RATE: 73 BPM | DIASTOLIC BLOOD PRESSURE: 84 MMHG | WEIGHT: 163.6 LBS | BODY MASS INDEX: 30.89 KG/M2

## 2023-03-21 DIAGNOSIS — K50.10 CROHN'S DISEASE OF LARGE INTESTINE WITHOUT COMPLICATION: Primary | ICD-10-CM

## 2023-03-21 DIAGNOSIS — K22.70 BARRETT'S ESOPHAGUS WITHOUT DYSPLASIA: ICD-10-CM

## 2023-03-21 DIAGNOSIS — K90.89 OTHER INTESTINAL MALABSORPTION: ICD-10-CM

## 2023-03-21 DIAGNOSIS — K58.8 OTHER IRRITABLE BOWEL SYNDROME: ICD-10-CM

## 2023-03-21 DIAGNOSIS — R12 HEARTBURN: ICD-10-CM

## 2023-03-21 PROCEDURE — 1160F RVW MEDS BY RX/DR IN RCRD: CPT | Performed by: NURSE PRACTITIONER

## 2023-03-21 PROCEDURE — 99214 OFFICE O/P EST MOD 30 MIN: CPT | Performed by: NURSE PRACTITIONER

## 2023-03-21 PROCEDURE — 1159F MED LIST DOCD IN RCRD: CPT | Performed by: NURSE PRACTITIONER

## 2023-03-21 RX ORDER — PILOCARPINE HYDROCHLORIDE 5 MG/1
TABLET, FILM COATED ORAL
COMMUNITY
Start: 2023-02-11

## 2023-03-21 RX ORDER — PANTOPRAZOLE SODIUM 40 MG/1
40 TABLET, DELAYED RELEASE ORAL DAILY
Qty: 90 TABLET | Refills: 3 | Status: SHIPPED | OUTPATIENT
Start: 2023-03-21

## 2023-03-21 NOTE — PROGRESS NOTES
Chief Complaint   Patient presents with   • Crohn's Disease   • Heartburn       HPI    Kirstin Hughes is a  72 y.o. female here for a follow up visit for liu's esophagus, Crohn's disease, irritable bowel syndrome, heartburn and colon polyps.      Patient follows with Dr. Milian, new to me.    Crohn's has been in remission for a number of years.  Her primary complaint today is breakthrough dyspeptic symptoms despite over-the-counter Nexium.  She would like to try something stronger.  Denies nausea, vomiting, dysphagia, odynophagia.  Appetite is good.  She is working on weight loss as she was recently told she could be prediabetic.  Denies diarrhea, constipation or rectal bleeding.  She takes antispasmodics when needed along with nightly Elavil for IBS.    Colonoscopy and EGD jan 2020 showed one small tubular adenoma, Crohn's disease in remission, surveillance biopsies negative for dysplasia    EGD showed irregular Z line with Liu's esophagus no dysplasia small hiatal hernia  No evidence of celiac disease.  Recall 2 years.    Past Medical History:   Diagnosis Date   • Abnormal LFTs    • Liu esophagus    • Colon polyp    • CREST (calcinosis, Raynaud's phenomenon, esophageal dysfunction, sclerodactyly, telangiectasia) (HCC)    • Crohn's disease (HCC)    • Diverticulitis    • Diverticulosis    • Fibromyalgia    • GERD (gastroesophageal reflux disease)    • Hernia 1970's    Hiatal   • Hypertension    • Internal hemorrhoids    • Irritable bowel syndrome ?    ?   • Lactose intolerance    • Osteoarthritis    • Sleep apnea    • Thyroid disease        Past Surgical History:   Procedure Laterality Date   • ABDOMINAL SURGERY  2007    sigmoid colectomy   • BILATERAL BREAST REDUCTION     • CATARACT EXTRACTION     • CERVICAL DISC SURGERY     • COLON RESECTION      DIVERTICULITIS 2007 - [place]   • COLONOSCOPY  8/20015    NB, tics   • COLONOSCOPY N/A 09/18/2017    Diverticulosis, IH, Crohn's in remission, Path benign    • COLONOSCOPY N/A 2020    Procedure: COLONOSCOPY  TO ANASTOMOSIS INTO CECUM AND NORMAL TI WITH BX;  Surgeon: Lamont Milian MD;  Location: [facility] ENDOSCOPY;  Service: Gastroenterology   • ENDOSCOPY N/A 2017    Z-line irreg, Path benign   • ENDOSCOPY N/A 2020    Procedure: ESOPHAGOGASTRODUODENOSCOPY WITH BX;  Surgeon: Lamont Milian MD;  Location: [facility] ENDOSCOPY;  Service: Gastroenterology   • HYSTERECTOMY     • REDUCTION MAMMAPLASTY     • SALIVARY GLAND EXCISION     • THYROIDECTOMY, PARTIAL     • TONSILLECTOMY     • TUBAL ABDOMINAL LIGATION     • UPPER GASTROINTESTINAL ENDOSCOPY  2015    z line irrefgular       Scheduled Meds:     Continuous Infusions: No current facility-administered medications for this visit.      PRN Meds:     Allergies   Allergen Reactions   • Flagyl [Metronidazole] Nausea Only   • Levaquin [Levofloxacin] Nausea Only       Social History     Socioeconomic History   • Marital status:    Tobacco Use   • Smoking status: Former     Packs/day: 1.00     Years: 10.00     Pack years: 10.00     Types: Cigarettes     Start date: 3/1/1983     Quit date: 1993     Years since quittin.6   • Smokeless tobacco: Never   • Tobacco comments:     Non smoker since    Substance and Sexual Activity   • Alcohol use: Not Currently     Comment: Only rarely. Parental ETOH abuse, therefore I didn’t drink.   • Drug use: Never   • Sexual activity: Not Currently     Partners: Male       Family History   Problem Relation Age of Onset   • Colon polyps Father    • Pancreatic cancer Paternal Grandmother    • Colon cancer Maternal Grandfather         About 1954   • Breast cancer Sister    • Inflammatory bowel disease Mother         Dx with Crohn’s at age of 68   • Rectal cancer Brother        Review of Systems   Constitutional: Negative for activity change, appetite change, fatigue, fever and unexpected weight change.   HENT: Negative for trouble swallowing.    Respiratory:  Negative for apnea, cough, choking, chest tightness, shortness of breath and wheezing.    Cardiovascular: Negative for chest pain, palpitations and leg swelling.   Gastrointestinal: Negative for abdominal distention, abdominal pain, anal bleeding, blood in stool, constipation, diarrhea, nausea, rectal pain and vomiting.        + Heartburn       Vitals:    03/21/23 0951   BP: 145/84   Pulse: 73   Temp: 97.1 °F (36.2 °C)       Physical Exam  Constitutional:       Appearance: She is well-developed.   Abdominal:      General: Bowel sounds are normal. There is no distension.      Palpations: Abdomen is soft. There is no mass.      Tenderness: There is no abdominal tenderness. There is no guarding.      Hernia: No hernia is present.   Skin:     General: Skin is warm and dry.      Capillary Refill: Capillary refill takes less than 2 seconds.   Neurological:      Mental Status: She is alert and oriented to person, place, and time.   Psychiatric:         Behavior: Behavior normal.     Assessment    Diagnoses and all orders for this visit:    Crohn's disease of large intestine without complication (HCC) (Primary)  -     CBC & Differential  -     Comprehensive Metabolic Panel  -     Iron and TIBC  -     Vitamin B12 and Folate  -     Vitamin D 25 Hydroxy  -     Case Request; Standing  -     Obtain Informed Consent; Standing  -     Verify Bowel Prep Was Successful; Standing  -     Give Tap Water Enema If Bowel Prep Insufficient; Standing  -     Case Request    Heartburn  -     Case Request; Standing  -     Obtain Informed Consent; Standing  -     Verify Bowel Prep Was Successful; Standing  -     Give Tap Water Enema If Bowel Prep Insufficient; Standing  -     Case Request    Other irritable bowel syndrome    Riddle's esophagus without dysplasia:  -     Case Request; Standing  -     Obtain Informed Consent; Standing  -     Verify Bowel Prep Was Successful; Standing  -     Give Tap Water Enema If Bowel Prep Insufficient;  Standing  -     Case Request    Other orders  -     pantoprazole (PROTONIX) 40 MG EC tablet; Take 1 tablet by mouth Daily.  Dispense: 90 tablet; Refill: 3       Plan    EGD and colonoscopy with Dr. Milian for surveillance purposes  Stop over-the-counter Nexium  Begin Protonix 40 mg once daily  Follow an antireflux diet  Labs today as above  Follow-up and further recommendations pending aforementioned work-up         AMBER Kunz  [facility]  [address] [place] [address]  Office: [phone]

## 2023-03-21 NOTE — TELEPHONE ENCOUNTER
MARIPOSA patient via telephone for. Scheduled Colonoscopy/EGD May 25, 2023 with arrival time of 1:30 pm. Prep paperwork mailed to verified address on file. Patient advised arrival time may change based on [facility] guidelines. SW Larisssa MIRALAX

## 2023-03-22 LAB
25(OH)D3+25(OH)D2 SERPL-MCNC: 78.8 NG/ML (ref 30–100)
ALBUMIN SERPL-MCNC: 4.8 G/DL (ref 3.5–5.2)
ALBUMIN/GLOB SERPL: 2.1 G/DL
ALP SERPL-CCNC: 70 U/L (ref 39–117)
ALT SERPL-CCNC: 13 U/L (ref 1–33)
AST SERPL-CCNC: 21 U/L (ref 1–32)
BASOPHILS # BLD AUTO: 0.08 10*3/MM3 (ref 0–0.2)
BASOPHILS NFR BLD AUTO: 1.3 % (ref 0–1.5)
BILIRUB SERPL-MCNC: 0.2 MG/DL (ref 0–1.2)
BUN SERPL-MCNC: 8 MG/DL (ref 8–23)
BUN/CREAT SERPL: 10.3 (ref 7–25)
CALCIUM SERPL-MCNC: 10.4 MG/DL (ref 8.6–10.5)
CHLORIDE SERPL-SCNC: 102 MMOL/L (ref 98–107)
CO2 SERPL-SCNC: 26.1 MMOL/L (ref 22–29)
CREAT SERPL-MCNC: 0.78 MG/DL (ref 0.57–1)
EGFRCR SERPLBLD CKD-EPI 2021: 80.3 ML/MIN/1.73
EOSINOPHIL # BLD AUTO: 0.44 10*3/MM3 (ref 0–0.4)
EOSINOPHIL NFR BLD AUTO: 7.2 % (ref 0.3–6.2)
ERYTHROCYTE [DISTWIDTH] IN BLOOD BY AUTOMATED COUNT: 12.3 % (ref 12.3–15.4)
FOLATE SERPL-MCNC: 3.07 NG/ML (ref 4.78–24.2)
GLOBULIN SER CALC-MCNC: 2.3 GM/DL
GLUCOSE SERPL-MCNC: 88 MG/DL (ref 65–99)
HCT VFR BLD AUTO: 41.3 % (ref 34–46.6)
HGB BLD-MCNC: 13.7 G/DL (ref 12–15.9)
IMM GRANULOCYTES # BLD AUTO: 0.01 10*3/MM3 (ref 0–0.05)
IMM GRANULOCYTES NFR BLD AUTO: 0.2 % (ref 0–0.5)
IRON SATN MFR SERPL: 14 % (ref 20–50)
IRON SERPL-MCNC: 60 MCG/DL (ref 37–145)
LYMPHOCYTES # BLD AUTO: 2.46 10*3/MM3 (ref 0.7–3.1)
LYMPHOCYTES NFR BLD AUTO: 40.1 % (ref 19.6–45.3)
MCH RBC QN AUTO: 28.6 PG (ref 26.6–33)
MCHC RBC AUTO-ENTMCNC: 33.2 G/DL (ref 31.5–35.7)
MCV RBC AUTO: 86.2 FL (ref 79–97)
MONOCYTES # BLD AUTO: 0.78 10*3/MM3 (ref 0.1–0.9)
MONOCYTES NFR BLD AUTO: 12.7 % (ref 5–12)
NEUTROPHILS # BLD AUTO: 2.37 10*3/MM3 (ref 1.7–7)
NEUTROPHILS NFR BLD AUTO: 38.5 % (ref 42.7–76)
NRBC BLD AUTO-RTO: 0 /100 WBC (ref 0–0.2)
PLATELET # BLD AUTO: 234 10*3/MM3 (ref 140–450)
POTASSIUM SERPL-SCNC: 4.1 MMOL/L (ref 3.5–5.2)
PROT SERPL-MCNC: 7.1 G/DL (ref 6–8.5)
RBC # BLD AUTO: 4.79 10*6/MM3 (ref 3.77–5.28)
SODIUM SERPL-SCNC: 142 MMOL/L (ref 136–145)
TIBC SERPL-MCNC: 420 MCG/DL
UIBC SERPL-MCNC: 360 MCG/DL (ref 112–346)
VIT B12 SERPL-MCNC: 359 PG/ML (ref 211–946)
WBC # BLD AUTO: 6.14 10*3/MM3 (ref 3.4–10.8)

## 2023-03-22 NOTE — PROGRESS NOTES
Please inform Holley that her lab work shows normal vitamin D, normal liver function test, normal B12.  She has a slight folate deficiency recommend she take a multivitamin with folic acid daily.  Normal iron stores.  No anemia.  Await endoscopic findings

## 2023-03-29 ENCOUNTER — TELEPHONE (OUTPATIENT)
Dept: GASTROENTEROLOGY | Facility: CLINIC | Age: 73
End: 2023-03-29
Payer: MEDICARE

## 2023-03-29 NOTE — TELEPHONE ENCOUNTER
----- Message from AMBER Kunz sent at 3/22/2023 11:38 AM EDT -----  Please inform Holley that her lab work shows normal vitamin D, normal liver function test, normal B12.  She has a slight folate deficiency recommend she take a multivitamin with folic acid daily.  Normal iron stores.  No anemia.  Await endoscopic find  ings

## 2023-05-08 RX ORDER — POTASSIUM CHLORIDE 750 MG/1
TABLET, FILM COATED, EXTENDED RELEASE ORAL
Qty: 360 TABLET | Refills: 3 | Status: SHIPPED | OUTPATIENT
Start: 2023-05-08

## 2023-05-25 ENCOUNTER — HOSPITAL ENCOUNTER (OUTPATIENT)
Facility: HOSPITAL | Age: 73
Setting detail: HOSPITAL OUTPATIENT SURGERY
Discharge: HOME | End: 2023-05-25
Attending: INTERNAL MEDICINE | Admitting: INTERNAL MEDICINE
Payer: MEDICARE

## 2023-05-25 ENCOUNTER — ANESTHESIA (OUTPATIENT)
Dept: GASTROENTEROLOGY | Facility: HOSPITAL | Age: 73
End: 2023-05-25
Payer: MEDICARE

## 2023-05-25 ENCOUNTER — ANESTHESIA EVENT (OUTPATIENT)
Dept: GASTROENTEROLOGY | Facility: HOSPITAL | Age: 73
End: 2023-05-25
Payer: MEDICARE

## 2023-05-25 VITALS
BODY MASS INDEX: 30.96 KG/M2 | RESPIRATION RATE: 16 BRPM | HEIGHT: 61 IN | SYSTOLIC BLOOD PRESSURE: 115 MMHG | DIASTOLIC BLOOD PRESSURE: 63 MMHG | OXYGEN SATURATION: 94 % | HEART RATE: 69 BPM | WEIGHT: 164 LBS

## 2023-05-25 DIAGNOSIS — R12 HEARTBURN: ICD-10-CM

## 2023-05-25 DIAGNOSIS — K22.70 BARRETT'S ESOPHAGUS WITHOUT DYSPLASIA: ICD-10-CM

## 2023-05-25 DIAGNOSIS — K50.10 CROHN'S DISEASE OF LARGE INTESTINE WITHOUT COMPLICATION: ICD-10-CM

## 2023-05-25 PROCEDURE — 43239 EGD BIOPSY SINGLE/MULTIPLE: CPT | Performed by: INTERNAL MEDICINE

## 2023-05-25 PROCEDURE — 88312 SPECIAL STAINS GROUP 1: CPT | Performed by: INTERNAL MEDICINE

## 2023-05-25 PROCEDURE — 88313 SPECIAL STAINS GROUP 2: CPT | Performed by: INTERNAL MEDICINE

## 2023-05-25 PROCEDURE — S0260 H&P FOR SURGERY: HCPCS | Performed by: INTERNAL MEDICINE

## 2023-05-25 PROCEDURE — 88305 TISSUE EXAM BY PATHOLOGIST: CPT | Performed by: INTERNAL MEDICINE

## 2023-05-25 PROCEDURE — 25010000002 PROPOFOL 10 MG/ML EMULSION: Performed by: NURSE ANESTHETIST, CERTIFIED REGISTERED

## 2023-05-25 PROCEDURE — 45380 COLONOSCOPY AND BIOPSY: CPT | Performed by: INTERNAL MEDICINE

## 2023-05-25 RX ORDER — PROPOFOL 10 MG/ML
VIAL (ML) INTRAVENOUS CONTINUOUS PRN
Status: DISCONTINUED | OUTPATIENT
Start: 2023-05-25 | End: 2023-05-25 | Stop reason: SURG

## 2023-05-25 RX ORDER — SODIUM CHLORIDE 0.9 % (FLUSH) 0.9 %
10 SYRINGE (ML) INJECTION EVERY 12 HOURS SCHEDULED
Status: DISCONTINUED | OUTPATIENT
Start: 2023-05-25 | End: 2023-05-25 | Stop reason: HOSPADM

## 2023-05-25 RX ORDER — PROPOFOL 10 MG/ML
VIAL (ML) INTRAVENOUS AS NEEDED
Status: DISCONTINUED | OUTPATIENT
Start: 2023-05-25 | End: 2023-05-25 | Stop reason: SURG

## 2023-05-25 RX ORDER — GLYCOPYRROLATE 0.2 MG/ML
INJECTION INTRAMUSCULAR; INTRAVENOUS AS NEEDED
Status: DISCONTINUED | OUTPATIENT
Start: 2023-05-25 | End: 2023-05-25 | Stop reason: SURG

## 2023-05-25 RX ORDER — SODIUM CHLORIDE, SODIUM LACTATE, POTASSIUM CHLORIDE, CALCIUM CHLORIDE 600; 310; 30; 20 MG/100ML; MG/100ML; MG/100ML; MG/100ML
30 INJECTION, SOLUTION INTRAVENOUS CONTINUOUS PRN
Status: DISCONTINUED | OUTPATIENT
Start: 2023-05-25 | End: 2023-05-25 | Stop reason: HOSPADM

## 2023-05-25 RX ORDER — SODIUM CHLORIDE 0.9 % (FLUSH) 0.9 %
10 SYRINGE (ML) INJECTION AS NEEDED
Status: DISCONTINUED | OUTPATIENT
Start: 2023-05-25 | End: 2023-05-25 | Stop reason: HOSPADM

## 2023-05-25 RX ORDER — LIDOCAINE HYDROCHLORIDE 20 MG/ML
INJECTION, SOLUTION INFILTRATION; PERINEURAL AS NEEDED
Status: DISCONTINUED | OUTPATIENT
Start: 2023-05-25 | End: 2023-05-25 | Stop reason: SURG

## 2023-05-25 RX ADMIN — SODIUM CHLORIDE, POTASSIUM CHLORIDE, SODIUM LACTATE AND CALCIUM CHLORIDE 30 ML/HR: 600; 310; 30; 20 INJECTION, SOLUTION INTRAVENOUS at 11:51

## 2023-05-25 RX ADMIN — PROPOFOL 180 MCG/KG/MIN: 10 INJECTION, EMULSION INTRAVENOUS at 12:36

## 2023-05-25 RX ADMIN — LIDOCAINE HYDROCHLORIDE 60 MG: 20 INJECTION, SOLUTION INFILTRATION; PERINEURAL at 12:36

## 2023-05-25 RX ADMIN — Medication 80 MG: at 12:37

## 2023-05-25 RX ADMIN — GLYCOPYRROLATE 0.2 MG: 0.2 INJECTION INTRAMUSCULAR; INTRAVENOUS at 12:36

## 2023-05-25 NOTE — ANESTHESIA PREPROCEDURE EVALUATION
Anesthesia Evaluation     Patient summary reviewed and Nursing notes reviewed                Airway   Mallampati: II  TM distance: >3 FB  Neck ROM: limited  Dental      Pulmonary    (+) a smoker Former, sleep apnea,   Cardiovascular     Rhythm: regular  Rate: normal    (+) hypertension,       Neuro/Psych- negative ROS  GI/Hepatic/Renal/Endo    (+)  GERD,  thyroid problem     Musculoskeletal     Abdominal    Substance History - negative use     OB/GYN negative ob/gyn ROS         Other   arthritis,                      Anesthesia Plan    ASA 3     MAC     (I have reviewed the patient's history with the patient and the chart, including all pertinent laboratory results and imaging. I have explained the risks of anesthesia including but not limited to dental damage, corneal abrasion, nerve injury, MI, stroke, and death. Questions asked and answered. Anesthetic plan discussed with patient and team as indicated. Patient expressed understanding of the above.  )  intravenous induction     Anesthetic plan, risks, benefits, and alternatives have been provided, discussed and informed consent has been obtained with: patient.    Plan discussed with CRNA.        CODE STATUS:

## 2023-05-25 NOTE — H&P
[facility]  Pre Procedure History & Physical    Chief Complaint:   Time for my colonoscopy and egd     Subjective     HPI:   72 y.o. female presenting to endoscopy unit today for surveillance colonoscopy. And egd     Past Medical History:   Past Medical History:   Diagnosis Date   • Abnormal LFTs    • Riddle esophagus    • Colon polyp    • CREST (calcinosis, Raynaud's phenomenon, esophageal dysfunction, sclerodactyly, telangiectasia)    • Crohn's disease    • Diverticulitis    • Diverticulosis    • Fibromyalgia    • GERD (gastroesophageal reflux disease)    • Hernia 1970's    Hiatal   • Hypertension    • Internal hemorrhoids    • Irritable bowel syndrome ?    ?   • Lactose intolerance    • Osteoarthritis    • Sleep apnea    • Thyroid disease        Family History:  Family History   Problem Relation Age of Onset   • Colon polyps Father    • Pancreatic cancer Paternal Grandmother    • Colon cancer Maternal Grandfather         About 1954   • Breast cancer Sister    • Inflammatory bowel disease Mother         Dx with Crohn’s at age of 68   • Rectal cancer Brother        Social History:   reports that she quit smoking about 29 years ago. Her smoking use included cigarettes. She started smoking about 40 years ago. She has a 10.00 pack-year smoking history. She has never used smokeless tobacco. She reports that she does not currently use alcohol. She reports that she does not use drugs.    Medications:   Medications Prior to Admission   Medication Sig Dispense Refill Last Dose   • amitriptyline (ELAVIL) 25 MG tablet TAKE ONE TABLET BY MOUTH ONCE NIGHTLY 60 tablet 12 5/24/2023   • B Complex Vitamins (VITAMIN B-COMPLEX PO) Take  by mouth.   5/24/2023   • Cholecalciferol 125 MCG (5000 UT) tablet    5/24/2023   • cyanocobalamin (VITAMIN B-12) 1000 MCG tablet    5/24/2023   • DULoxetine (CYMBALTA) 30 MG capsule    5/24/2023   • estradiol (ESTRACE) 0.5 MG tablet    5/24/2023   • furosemide (LASIX) 40 MG  "tablet Take 1 tablet by mouth Daily.   5/24/2023   • HYDROcodone-acetaminophen (NORCO) 5-325 MG per tablet As Needed.   5/24/2023   • irbesartan (AVAPRO) 150 MG tablet Take 1 tablet by mouth Daily.   5/25/2023   • levothyroxine (SYNTHROID, LEVOTHROID) 75 MCG tablet Take 1 tablet by mouth Daily.   5/24/2023   • loratadine (CLARITIN) 10 MG tablet Take 1 tablet by mouth Daily.   5/24/2023   • pantoprazole (PROTONIX) 40 MG EC tablet Take 1 tablet by mouth Daily. 90 tablet 3 5/24/2023   • potassium chloride 10 MEQ CR tablet TAKE ONE TABLET BY MOUTH FOUR TIMES A DAY (Patient taking differently: 2 tablets.) 360 tablet 3 5/24/2023   • Probiotic Product (ALIGN PO) Take  by mouth As Needed.   5/24/2023   • hyoscyamine (LEVSIN) 0.125 MG SL tablet Take 1 tablet by mouth Every 4 (Four) Hours As Needed for Cramping. 60 tablet 5    • Loratadine-Pseudoephedrine (PX ALLERGY RELIEF D, LORATID, PO) Take  by mouth.      • MAGNESIUM PO Take  by mouth.      • pilocarpine (SALAGEN) 5 MG tablet           Allergies:  Flagyl [metronidazole] and Levaquin [levofloxacin]    Objective     Blood pressure 164/79, pulse 75, resp. rate 17, height 154.9 cm (61\"), weight 74.4 kg (164 lb), SpO2 99 %.  Physical Exam:   General: patient awake, alert and cooperative    Assessment & Plan     Diagnosis:  Personal hx of colon polyps  barrets  crohns    Anticipated Surgical Procedure:  Colonoscopy and egd     The risks, benefits, and alternatives of this procedure have been discussed with the patient or the responsible party- the patient understands and agrees to proceed.                                                                "

## 2023-05-25 NOTE — DISCHARGE INSTRUCTIONS

## 2023-05-25 NOTE — ANESTHESIA POSTPROCEDURE EVALUATION
Patient: Kirstin Hughes    Procedure Summary     Date: 05/25/23 Room / Location: [facility] ENDOSCOPY 8 /  [facility]    Anesthesia Start: 1232 Anesthesia Stop: 1300    Procedures:       ESOPHAGOGASTRODUODENOSCOPY with bx (Esophagus)      COLONOSCOPY into cecum and normal terminal ileum with bx Diagnosis:       Crohn's disease of large intestine without complication      Riddle's esophagus without dysplasia      Heartburn      (Crohn's disease of large intestine without complication (HCC) [K50.10])      (Riddle's esophagus without dysplasia [K22.70])      (Heartburn [R12])    Surgeons: Lamont Milian MD Provider: Venkat Cardenas MD    Anesthesia Type: MAC ASA Status: 3          Anesthesia Type: MAC    Vitals  Vitals Value Taken Time   /69 05/25/23 1258   Temp     Pulse 79 05/25/23 1258   Resp 14 05/25/23 1258   SpO2 97 % 05/25/23 1258           Post Anesthesia Care and Evaluation    Patient location during evaluation: PACU  Patient participation: complete - patient participated  Level of consciousness: awake and alert  Pain management: adequate    Airway patency: patent  Anesthetic complications: No anesthetic complications    Cardiovascular status: acceptable  Respiratory status: acceptable  Hydration status: acceptable    Comments: --------------------            05/25/23               1258     --------------------   BP:       125/69     Pulse:      79       Resp:       14       SpO2:      97%      --------------------

## 2023-05-26 LAB
LAB AP CASE REPORT: NORMAL
LAB AP DIAGNOSIS COMMENT: NORMAL
PATH REPORT.FINAL DX SPEC: NORMAL
PATH REPORT.GROSS SPEC: NORMAL

## 2023-05-26 NOTE — PROGRESS NOTES
Riddle's in remission  No evidence of active Crohn's disease or dysplasia  Colonoscopy and EGD recall 2 years

## 2023-05-30 NOTE — PROGRESS NOTES
Small area of Riddle's esophagus no evidence of dysplasia  No evidence of precancer in the colon biopsies  Colonoscopy and EGD recall 2 years

## 2023-06-13 ENCOUNTER — TELEPHONE (OUTPATIENT)
Dept: GASTROENTEROLOGY | Facility: CLINIC | Age: 73
End: 2023-06-13
Payer: MEDICARE

## 2023-06-13 NOTE — TELEPHONE ENCOUNTER
----- Message from Lamont Milian MD sent at 5/26/2023 11:06 AM EDT -----  Riddle's in remission  No evidence of active Crohn's disease or dysplasia  Colonoscopy and EGD recall 2 years

## 2023-06-13 NOTE — TELEPHONE ENCOUNTER
Colonoscopy and EGD placed in  and recall for 5/25/25.  Pt reviewed results via [place]. Sent pt [employer] msg advising of results. Advised to call if any questions.

## 2023-06-19 ENCOUNTER — OFFICE VISIT (OUTPATIENT)
Dept: GASTROENTEROLOGY | Facility: CLINIC | Age: 73
End: 2023-06-19
Payer: MEDICARE

## 2023-06-19 VITALS
TEMPERATURE: 96.2 F | BODY MASS INDEX: 31.02 KG/M2 | HEIGHT: 61 IN | OXYGEN SATURATION: 95 % | DIASTOLIC BLOOD PRESSURE: 71 MMHG | SYSTOLIC BLOOD PRESSURE: 121 MMHG | HEART RATE: 68 BPM | WEIGHT: 164.3 LBS

## 2023-06-19 DIAGNOSIS — K50.10 CROHN'S DISEASE OF LARGE INTESTINE WITHOUT COMPLICATION: Primary | ICD-10-CM

## 2023-06-19 DIAGNOSIS — K58.9 IRRITABLE BOWEL SYNDROME WITHOUT DIARRHEA: ICD-10-CM

## 2023-06-19 DIAGNOSIS — K21.9 GASTROESOPHAGEAL REFLUX DISEASE WITHOUT ESOPHAGITIS: ICD-10-CM

## 2023-06-19 DIAGNOSIS — K22.70 BARRETT'S ESOPHAGUS WITHOUT DYSPLASIA: ICD-10-CM

## 2023-06-19 PROCEDURE — 1159F MED LIST DOCD IN RCRD: CPT | Performed by: INTERNAL MEDICINE

## 2023-06-19 PROCEDURE — 99214 OFFICE O/P EST MOD 30 MIN: CPT | Performed by: INTERNAL MEDICINE

## 2023-06-19 PROCEDURE — 1160F RVW MEDS BY RX/DR IN RCRD: CPT | Performed by: INTERNAL MEDICINE

## 2023-06-19 RX ORDER — NICOTINE POLACRILEX 2 MG
10000 GUM BUCCAL
COMMUNITY
Start: 2023-06-02

## 2023-06-19 NOTE — PROGRESS NOTES
Chief Complaint   Patient presents with   • Crohn's Disease   • Liu's esophagus     Subjective     HPI  Kirstin Hughes is a 73 y.o. female who presents today for follow up.     follow up visit for liu's esophagus, Crohn's disease, irritable bowel syndrome, heartburn and colon polyps.         Crohn's has been in remission for a number of years.    She is having no breakthrough heartburn.  All symptoms well controlled on PPI daily.  Denies nausea, vomiting, dysphagia, odynophagia.  Appetite is good.  She is working on weight loss as she was recently told she could be prediabetic.  Denies diarrhea, constipation or rectal bleeding.  She takes antispasmodics when needed along with nightly Elavil for IBS.     Colonoscopy and EGD jan 2020 showed one small tubular adenoma, Crohn's disease in remission, surveillance biopsies negative for dysplasia    Most recent EGD and colonoscopy last month     EGD showed irregular Z line with Liu's esophagus no dysplasia small hiatal hernia  No evidence of celiac disease.  Recall 2 years.    Colonoscopy with no evidence of polyps or active Crohn's disease, no dysplasia in 4 quadrants 10 cm biopsies             Objective   Vitals:    06/19/23 1415   BP: 121/71   Pulse: 68   Temp: 96.2 °F (35.7 °C)   SpO2: 95%       Physical Exam  The following data was reviewed by: Lamont Milian MD on 06/19/2023:  Common labs        3/21/2023    10:41   Common Labs   Glucose 88    BUN 8    Creatinine 0.78    Sodium 142    Potassium 4.1    Chloride 102    Calcium 10.4    Total Protein 7.1    Albumin 4.8    Total Bilirubin 0.2    Alkaline Phosphatase 70    AST (SGOT) 21    ALT (SGPT) 13    WBC 6.14    Hemoglobin 13.7    Hematocrit 41.3    Platelets 234      CMP        3/21/2023    10:41   CMP   Glucose 88    BUN 8    Creatinine 0.78    Sodium 142    Potassium 4.1    Chloride 102    Calcium 10.4    Total Protein 7.1    Albumin 4.8    Globulin 2.3    Total Bilirubin 0.2    Alkaline Phosphatase  70    AST (SGOT) 21    ALT (SGPT) 13    BUN/Creatinine Ratio 10.3      CBC        3/21/2023    10:41   CBC   WBC 6.14    RBC 4.79    Hemoglobin 13.7    Hematocrit 41.3    MCV 86.2    MCH 28.6    MCHC 33.2    RDW 12.3    Platelets 234      CBC w/diff        3/21/2023    10:41   CBC w/Diff   WBC 6.14    RBC 4.79    Hemoglobin 13.7    Hematocrit 41.3    MCV 86.2    MCH 28.6    MCHC 33.2    RDW 12.3    Platelets 234    Neutrophil Rel % 38.5    Lymphocyte Rel % 40.1    Monocyte Rel % 12.7    Eosinophil Rel % 7.2    Basophil Rel % 1.3              Electrolytes        3/21/2023    10:41   Electrolytes   Sodium 142    Potassium 4.1    Chloride 102    Calcium 10.4      Renal Profile        3/21/2023    10:41   Renal Profile   BUN 8    Creatinine 0.78      Assessment & Plan   Assessment:     Crohn's disease-in remission  Riddle's esophagus  Irritable bowel syndrome      Plan:   Continue Levsin as needed  Continue Elavil for irritable bowel syndrome  Continue PPI daily for GERD  Patient has been in remission from Crohn's disease for many years and currently are not treating her with Crohn's medications  Office visit 6 months  EGD colonoscopy repeat May 2025    I spent 35 minutes caring for Kirstin on this date of service. This time includes time spent by me in the following activities:preparing for the visit, reviewing tests, obtaining and/or reviewing a separately obtained history, performing a medically appropriate examination and/or evaluation , counseling and educating the patient/family/caregiver, ordering medications, tests, or procedures, referring and communicating with other health care professionals , documenting information in the medical record, independently interpreting results and communicating that information with the patient/family/caregiver and care coordination    Lamont Milian MD  [facility]  [address] [place] [address]  Office: [phone]

## 2023-09-25 ENCOUNTER — TRANSCRIBE ORDERS (OUTPATIENT)
Dept: ADMINISTRATIVE | Facility: HOSPITAL | Age: 73
End: 2023-09-25
Payer: MEDICARE

## 2023-09-25 DIAGNOSIS — Z12.31 VISIT FOR SCREENING MAMMOGRAM: Primary | ICD-10-CM

## 2023-10-21 ENCOUNTER — HOSPITAL ENCOUNTER (OUTPATIENT)
Dept: MAMMOGRAPHY | Facility: HOSPITAL | Age: 73
Discharge: HOME | End: 2023-10-21
Admitting: FAMILY MEDICINE
Payer: MEDICARE

## 2023-10-21 DIAGNOSIS — Z12.31 VISIT FOR SCREENING MAMMOGRAM: ICD-10-CM

## 2023-10-21 PROCEDURE — 77063 BREAST TOMOSYNTHESIS BI: CPT

## 2023-10-21 PROCEDURE — 77067 SCR MAMMO BI INCL CAD: CPT

## 2024-02-06 ENCOUNTER — OFFICE VISIT (OUTPATIENT)
Dept: GASTROENTEROLOGY | Facility: CLINIC | Age: 74
End: 2024-02-06
Payer: MEDICARE

## 2024-02-06 VITALS
DIASTOLIC BLOOD PRESSURE: 91 MMHG | SYSTOLIC BLOOD PRESSURE: 154 MMHG | BODY MASS INDEX: 32 KG/M2 | WEIGHT: 169.5 LBS | HEIGHT: 61 IN | HEART RATE: 73 BPM | TEMPERATURE: 97.6 F

## 2024-02-06 DIAGNOSIS — K21.9 GASTROESOPHAGEAL REFLUX DISEASE WITHOUT ESOPHAGITIS: ICD-10-CM

## 2024-02-06 DIAGNOSIS — K22.70 BARRETT'S ESOPHAGUS WITHOUT DYSPLASIA: ICD-10-CM

## 2024-02-06 DIAGNOSIS — K58.9 IRRITABLE BOWEL SYNDROME WITHOUT DIARRHEA: ICD-10-CM

## 2024-02-06 DIAGNOSIS — K50.10 CROHN'S DISEASE OF LARGE INTESTINE WITHOUT COMPLICATION: Primary | ICD-10-CM

## 2024-02-06 NOTE — PROGRESS NOTES
"Chief Complaint  Crohn's Disease and Heartburn    Subjective          History Of Present Illness:    Kirstin Hughes is a  73 y.o. female patient of Dr. Milian who presents as a follow-up for Crohn's disease, IBS, Riddle's esophagus. Patient has a history of colon polyps, and sigmoidectomy for diverticulitis.    Patient reports she continues to have alternating bowel habits which is her baseline. Patient continues to have loose stools and urgency. Patient feels that she has a rectocele and has considered having this evaluated by surgery. Patient denies abdominal pain, blood in the stool. Patient will occasionally have abdominal cramping and uses Levsin as needed. She does continue to take elavil nightly. Patient denies significant heartburn, dysphagia, nausea, and vomiting. She continues to take pantoprazole 40 mg daily. Appetite is good and weight is stable.     Additional data reviewed:  EGD 5/25/2023 -irregular Z-line, normal stomach and duodenum.  Recall 3 years.  Colonoscopy 5/25/2023 -normal-appearing terminal ileum, diverticulosis, nonbleeding internal hemorrhoids, otherwise normal.  Biopsies consistent with Crohn's in remission. Recall 2 years.    Objective   Vital Signs:   /91   Pulse 73   Temp 97.6 °F (36.4 °C)   Ht 154.9 cm (61\")   Wt 76.9 kg (169 lb 8 oz)   BMI 32.03 kg/m²       Physical Exam  Vitals reviewed.   Constitutional:       General: She is not in acute distress.     Appearance: Normal appearance. She is not ill-appearing.   HENT:      Head: Normocephalic and atraumatic.      Nose: Nose normal.      Mouth/Throat:      Mouth: Mucous membranes are dry.      Pharynx: Oropharynx is clear.   Eyes:      Extraocular Movements: Extraocular movements intact.      Conjunctiva/sclera: Conjunctivae normal.      Pupils: Pupils are equal, round, and reactive to light.   Pulmonary:      Effort: Pulmonary effort is normal.   Abdominal:      General: There is no distension.      Palpations: Abdomen " Pt presents to Ed in wheelchair reports of pain in lower left back since 6/26  increasing in severity impacting pt ability to ambulate over the last couple days, pt reports recent ED visit and d/c Wednesday Dx L4 bulging disc with MRI done. Pt reports baseline neuropathy and numbness, pt denies loss of bowel/bladder control. Pt AxOx4, restless in chair, pt tachypneic and tearful.       is soft. There is no mass.      Tenderness: There is no abdominal tenderness.   Musculoskeletal:         General: No swelling. Normal range of motion.      Cervical back: Normal range of motion.   Skin:     General: Skin is warm and dry.      Findings: No bruising or rash.   Neurological:      General: No focal deficit present.      Mental Status: She is alert and oriented to person, place, and time.      Motor: No weakness.      Gait: Gait normal.   Psychiatric:         Mood and Affect: Mood normal.          Result Review :   The following data was reviewed by: Davina Bhatt PA-C on 02/06/2024:  CMP          3/21/2023    10:41   CMP   Glucose 88    BUN 8    Creatinine 0.78    Sodium 142    Potassium 4.1    Chloride 102    Calcium 10.4    Total Protein 7.1    Albumin 4.8    Globulin 2.3    Total Bilirubin 0.2    Alkaline Phosphatase 70    AST (SGOT) 21    ALT (SGPT) 13    BUN/Creatinine Ratio 10.3      CBC          3/21/2023    10:41   CBC   WBC 6.14    RBC 4.79    Hemoglobin 13.7    Hematocrit 41.3    MCV 86.2    MCH 28.6    MCHC 33.2    RDW 12.3    Platelets 234            Assessment and Plan    Diagnoses and all orders for this visit:    1. Crohn's disease of large intestine without complication (Primary)  Overview:  Added automatically from request for surgery [id number]      2. 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 Gastroesophageal reflux disease without esophagitis  Overview:  Added automatically from request for surgery [id number]      4. Irritable bowel syndrome without diarrhea  Overview:  Added automatically from request for surgery [id number]         Continue pantoprazole 40 mg daily with history of Riddle's esophagus.   Crohn's remains in remission based on 2023 colonoscopy. No current therapies recommended at this time.  Patient continues to have intermittent episodes of diarrhea and urgency. Recommend she increase her fiber intake  and utilize her Levsin more frequently.   Continue Elavil nightly   We did discuss her concern for rectocele and would recommend referral to colorectal surgery. She would like to hold off at this time but will continue to consider.     Follow Up   Return in about 6 months (around 8/6/2024) for Davina Martinez PA-C.    Dragon dictation used throughout this note.            Davina Martinez PA-C   [facility]  [address] [place] [address]  Office: [phone]

## 2024-02-22 ENCOUNTER — TRANSCRIBE ORDERS (OUTPATIENT)
Dept: ADMINISTRATIVE | Facility: HOSPITAL | Age: 74
End: 2024-02-22
Payer: MEDICARE

## 2024-02-22 DIAGNOSIS — R06.00 DYSPNEA, UNSPECIFIED TYPE: Primary | ICD-10-CM

## 2024-02-26 RX ORDER — AMITRIPTYLINE HYDROCHLORIDE 25 MG/1
TABLET, FILM COATED ORAL
Qty: 60 TABLET | Refills: 12 | Status: SHIPPED | OUTPATIENT
Start: 2024-02-26

## 2024-03-06 ENCOUNTER — HOSPITAL ENCOUNTER (OUTPATIENT)
Dept: CARDIOLOGY | Facility: HOSPITAL | Age: 74
Discharge: HOME | End: 2024-03-06
Admitting: INTERNAL MEDICINE
Payer: MEDICARE

## 2024-03-06 VITALS
BODY MASS INDEX: 31.91 KG/M2 | WEIGHT: 169 LBS | OXYGEN SATURATION: 98 % | HEIGHT: 61 IN | SYSTOLIC BLOOD PRESSURE: 162 MMHG | DIASTOLIC BLOOD PRESSURE: 80 MMHG | HEART RATE: 71 BPM

## 2024-03-06 DIAGNOSIS — R06.00 DYSPNEA, UNSPECIFIED TYPE: ICD-10-CM

## 2024-03-06 LAB
AORTIC ARCH: 2.3 CM
ASCENDING AORTA: 3.1 CM
BH CV ECHO MEAS - ACS: 1.63 CM
BH CV ECHO MEAS - AO MAX PG: 6.8 MMHG
BH CV ECHO MEAS - AO MEAN PG: 4 MMHG
BH CV ECHO MEAS - AO ROOT DIAM: 3 CM
BH CV ECHO MEAS - AO V2 MAX: 130 CM/SEC
BH CV ECHO MEAS - AO V2 VTI: 26.8 CM
BH CV ECHO MEAS - AVA(I,D): 2.35 CM2
BH CV ECHO MEAS - EDV(CUBED): 41 ML
BH CV ECHO MEAS - EDV(MOD-SP2): 48 ML
BH CV ECHO MEAS - EDV(MOD-SP4): 61 ML
BH CV ECHO MEAS - EF(MOD-BP): 75.6 %
BH CV ECHO MEAS - EF(MOD-SP2): 75 %
BH CV ECHO MEAS - EF(MOD-SP4): 77 %
BH CV ECHO MEAS - ESV(CUBED): 9 ML
BH CV ECHO MEAS - ESV(MOD-SP2): 12 ML
BH CV ECHO MEAS - ESV(MOD-SP4): 14 ML
BH CV ECHO MEAS - FS: 39.8 %
BH CV ECHO MEAS - IVS/LVPW: 0.85 CM
BH CV ECHO MEAS - IVSD: 1.09 CM
BH CV ECHO MEAS - LAT PEAK E' VEL: 8.8 CM/SEC
BH CV ECHO MEAS - LV DIASTOLIC VOL/BSA (35-75): 34.7 CM2
BH CV ECHO MEAS - LV MASS(C)D: 130.6 GRAMS
BH CV ECHO MEAS - LV MAX PG: 5.4 MMHG
BH CV ECHO MEAS - LV MEAN PG: 3 MMHG
BH CV ECHO MEAS - LV SYSTOLIC VOL/BSA (12-30): 8 CM2
BH CV ECHO MEAS - LV V1 MAX: 116 CM/SEC
BH CV ECHO MEAS - LV V1 VTI: 24.8 CM
BH CV ECHO MEAS - LVIDD: 3.4 CM
BH CV ECHO MEAS - LVIDS: 2.08 CM
BH CV ECHO MEAS - LVOT AREA: 2.5 CM2
BH CV ECHO MEAS - LVOT DIAM: 1.8 CM
BH CV ECHO MEAS - LVPWD: 1.28 CM
BH CV ECHO MEAS - MED PEAK E' VEL: 8.3 CM/SEC
BH CV ECHO MEAS - MV A DUR: 0.12 SEC
BH CV ECHO MEAS - MV A MAX VEL: 84.4 CM/SEC
BH CV ECHO MEAS - MV DEC SLOPE: 314.1 CM/SEC2
BH CV ECHO MEAS - MV DEC TIME: 0.23 SEC
BH CV ECHO MEAS - MV E MAX VEL: 54 CM/SEC
BH CV ECHO MEAS - MV E/A: 0.64
BH CV ECHO MEAS - MV MAX PG: 2.6 MMHG
BH CV ECHO MEAS - MV MEAN PG: 0.82 MMHG
BH CV ECHO MEAS - MV P1/2T: 63.9 MSEC
BH CV ECHO MEAS - MV V2 VTI: 22.1 CM
BH CV ECHO MEAS - MVA(P1/2T): 3.4 CM2
BH CV ECHO MEAS - MVA(VTI): 2.8 CM2
BH CV ECHO MEAS - PA ACC TIME: 0.18 SEC
BH CV ECHO MEAS - PA V2 MAX: 91.1 CM/SEC
BH CV ECHO MEAS - PI END-D VEL: 119.3 CM/SEC
BH CV ECHO MEAS - PULM A REVS DUR: 0.08 SEC
BH CV ECHO MEAS - PULM A REVS VEL: 31.7 CM/SEC
BH CV ECHO MEAS - PULM DIAS VEL: 41.6 CM/SEC
BH CV ECHO MEAS - PULM S/D: 1.36
BH CV ECHO MEAS - PULM SYS VEL: 56.6 CM/SEC
BH CV ECHO MEAS - RAP SYSTOLE: 3 MMHG
BH CV ECHO MEAS - RV MAX PG: 2.05 MMHG
BH CV ECHO MEAS - RV V1 MAX: 71.6 CM/SEC
BH CV ECHO MEAS - RV V1 VTI: 13.6 CM
BH CV ECHO MEAS - RVSP: 19 MMHG
BH CV ECHO MEAS - SI(MOD-SP2): 20.5 ML/M2
BH CV ECHO MEAS - SI(MOD-SP4): 26.7 ML/M2
BH CV ECHO MEAS - SUP REN AO DIAM: 2.3 CM
BH CV ECHO MEAS - SV(LVOT): 63 ML
BH CV ECHO MEAS - SV(MOD-SP2): 36 ML
BH CV ECHO MEAS - SV(MOD-SP4): 47 ML
BH CV ECHO MEAS - TAPSE (>1.6): 2.48 CM
BH CV ECHO MEAS - TR MAX PG: 15.8 MMHG
BH CV ECHO MEAS - TR MAX VEL: 198.8 CM/SEC
BH CV ECHO MEASUREMENTS AVERAGE E/E' RATIO: 6.32
BH CV XLRA - RV BASE: 2.5 CM
BH CV XLRA - RV LENGTH: 6 CM
BH CV XLRA - RV MID: 2.08 CM
BH CV XLRA - TDI S': 14.4 CM/SEC
LEFT ATRIUM VOLUME INDEX: 11.6 ML/M2
SINUS: 2.47 CM
STJ: 2.17 CM

## 2024-03-06 PROCEDURE — 93306 TTE W/DOPPLER COMPLETE: CPT | Performed by: INTERNAL MEDICINE

## 2024-03-06 PROCEDURE — 25510000001 PERFLUTREN (DEFINITY) 8.476 MG IN SODIUM CHLORIDE (PF) 0.9 % 10 ML INJECTION: Performed by: INTERNAL MEDICINE

## 2024-03-06 PROCEDURE — 93306 TTE W/DOPPLER COMPLETE: CPT

## 2024-03-06 RX ADMIN — PERFLUTREN 1.5 ML: 6.52 INJECTION, SUSPENSION INTRAVENOUS at 15:05

## 2024-03-12 RX ORDER — PANTOPRAZOLE SODIUM 40 MG/1
40 TABLET, DELAYED RELEASE ORAL DAILY
Qty: 90 TABLET | Refills: 3 | Status: SHIPPED | OUTPATIENT
Start: 2024-03-12

## 2024-07-22 RX ORDER — POTASSIUM CHLORIDE 750 MG/1
20 TABLET, FILM COATED, EXTENDED RELEASE ORAL 2 TIMES DAILY
Qty: 60 TABLET | Refills: 12 | Status: SHIPPED | OUTPATIENT
Start: 2024-07-22

## 2025-01-23 ENCOUNTER — TRANSCRIBE ORDERS (OUTPATIENT)
Dept: ADMINISTRATIVE | Facility: HOSPITAL | Age: 75
End: 2025-01-23
Payer: MEDICARE

## 2025-01-23 DIAGNOSIS — Z12.31 SCREENING MAMMOGRAM FOR BREAST CANCER: Primary | ICD-10-CM

## 2025-03-14 ENCOUNTER — HOSPITAL ENCOUNTER (OUTPATIENT)
Dept: MAMMOGRAPHY | Facility: HOSPITAL | Age: 75
Discharge: HOME | End: 2025-03-14
Admitting: FAMILY MEDICINE
Payer: MEDICARE

## 2025-03-14 DIAGNOSIS — Z12.31 SCREENING MAMMOGRAM FOR BREAST CANCER: ICD-10-CM

## 2025-03-14 PROCEDURE — 77063 BREAST TOMOSYNTHESIS BI: CPT

## 2025-03-14 PROCEDURE — 77067 SCR MAMMO BI INCL CAD: CPT

## 2025-08-26 RX ORDER — POTASSIUM CHLORIDE 750 MG/1
20 TABLET, EXTENDED RELEASE ORAL 2 TIMES DAILY
Qty: 60 TABLET | Refills: 12 | OUTPATIENT
Start: 2025-08-26

## (undated) DEVICE — SNAR POLYP SENSATION STDOVL 27 240 BX40

## (undated) DEVICE — BITEBLOCK OMNI BLOC

## (undated) DEVICE — TUBING, SUCTION, 1/4" X 10', STRAIGHT: Brand: MEDLINE

## (undated) DEVICE — FRCP BX RADJAW4 NDL 2.8 240CM LG OG BX40

## (undated) DEVICE — FRCP BIOP RADLJAW4 HOT 2.2X240 BX40

## (undated) DEVICE — CANN NASL CO2 TRULINK W/O2 A/

## (undated) DEVICE — TBG 02 CRUSH RESIST LF CLR 7FT

## (undated) DEVICE — ADAPT CLN BIOGUARD AIR/H2O DISP

## (undated) DEVICE — CANN O2 ETCO2 FITS ALL CONN CO2 SMPL A/ 7IN DISP LF

## (undated) DEVICE — SINGLE-USE BIOPSY FORCEPS: Brand: RADIAL JAW 4

## (undated) DEVICE — THE SINGLE USE ETRAP – POLYP TRAP IS USED FOR SUCTION RETRIEVAL OF ENDOSCOPICALLY REMOVED POLYPS.: Brand: ETRAP

## (undated) DEVICE — SENSR O2 OXIMAX FNGR A/ 18IN NONSTR

## (undated) DEVICE — KT ORCA ORCAPOD DISP STRL

## (undated) DEVICE — Device: Brand: DEFENDO AIR/WATER/SUCTION AND BIOPSY VALVE

## (undated) DEVICE — KT VLV BIOGUARD SXN BIOP AIR/H20 CONN 4PC DISP

## (undated) DEVICE — LN SMPL CO2 SHTRM SD STREAM W/M LUER

## (undated) DEVICE — THE TORRENT IRRIGATION SCOPE CONNECTOR IS USED WITH THE TORRENT IRRIGATION TUBING TO PROVIDE IRRIGATION FLUIDS SUCH AS STERILE WATER DURING GASTROINTESTINAL ENDOSCOPIC PROCEDURES WHEN USED IN CONJUNCTION WITH AN IRRIGATION PUMP (OR ELECTROSURGICAL UNIT).: Brand: TORRENT